# Patient Record
Sex: FEMALE | Race: BLACK OR AFRICAN AMERICAN | NOT HISPANIC OR LATINO | Employment: OTHER | URBAN - METROPOLITAN AREA
[De-identification: names, ages, dates, MRNs, and addresses within clinical notes are randomized per-mention and may not be internally consistent; named-entity substitution may affect disease eponyms.]

---

## 2017-06-26 ENCOUNTER — PROCEDURE VISIT (OUTPATIENT)
Dept: PULMONOLOGY | Facility: CLINIC | Age: 67
End: 2017-06-26
Payer: MEDICARE

## 2017-06-26 ENCOUNTER — LAB VISIT (OUTPATIENT)
Dept: LAB | Facility: HOSPITAL | Age: 67
End: 2017-06-26
Attending: INTERNAL MEDICINE
Payer: MEDICARE

## 2017-06-26 ENCOUNTER — OFFICE VISIT (OUTPATIENT)
Dept: PULMONOLOGY | Facility: CLINIC | Age: 67
End: 2017-06-26
Payer: MEDICARE

## 2017-06-26 VITALS
HEART RATE: 67 BPM | DIASTOLIC BLOOD PRESSURE: 62 MMHG | BODY MASS INDEX: 30.73 KG/M2 | SYSTOLIC BLOOD PRESSURE: 140 MMHG | BODY MASS INDEX: 30.63 KG/M2 | OXYGEN SATURATION: 99 % | WEIGHT: 179.44 LBS | WEIGHT: 180 LBS | RESPIRATION RATE: 18 BRPM | HEIGHT: 64 IN | HEIGHT: 64 IN

## 2017-06-26 DIAGNOSIS — D86.9 SARCOIDOSIS: Chronic | ICD-10-CM

## 2017-06-26 DIAGNOSIS — J98.4 MIXED RESTRICTIVE AND OBSTRUCTIVE LUNG DISEASE: Chronic | ICD-10-CM

## 2017-06-26 DIAGNOSIS — J43.9 MIXED RESTRICTIVE AND OBSTRUCTIVE LUNG DISEASE: Chronic | ICD-10-CM

## 2017-06-26 DIAGNOSIS — J96.11 CHRONIC RESPIRATORY FAILURE WITH HYPOXIA: Chronic | ICD-10-CM

## 2017-06-26 DIAGNOSIS — D86.9 SARCOIDOSIS: Primary | Chronic | ICD-10-CM

## 2017-06-26 LAB
ALBUMIN SERPL BCP-MCNC: 3.6 G/DL
ALP SERPL-CCNC: 210 U/L
ALT SERPL W/O P-5'-P-CCNC: 26 U/L
ANION GAP SERPL CALC-SCNC: 8 MMOL/L
AST SERPL-CCNC: 29 U/L
BILIRUB SERPL-MCNC: 0.3 MG/DL
BUN SERPL-MCNC: 14 MG/DL
CALCIUM SERPL-MCNC: 9 MG/DL
CHLORIDE SERPL-SCNC: 105 MMOL/L
CO2 SERPL-SCNC: 27 MMOL/L
CREAT SERPL-MCNC: 0.9 MG/DL
EST. GFR  (AFRICAN AMERICAN): >60 ML/MIN/1.73 M^2
EST. GFR  (NON AFRICAN AMERICAN): >60 ML/MIN/1.73 M^2
GLUCOSE SERPL-MCNC: 85 MG/DL
POTASSIUM SERPL-SCNC: 4 MMOL/L
PROT SERPL-MCNC: 8.3 G/DL
SODIUM SERPL-SCNC: 140 MMOL/L

## 2017-06-26 PROCEDURE — 80053 COMPREHEN METABOLIC PANEL: CPT

## 2017-06-26 PROCEDURE — 99214 OFFICE O/P EST MOD 30 MIN: CPT | Mod: 25,S$PBB,, | Performed by: INTERNAL MEDICINE

## 2017-06-26 PROCEDURE — 36415 COLL VENOUS BLD VENIPUNCTURE: CPT

## 2017-06-26 PROCEDURE — 99999 PR PBB SHADOW E&M-EST. PATIENT-LVL III: CPT | Mod: PBBFAC,,, | Performed by: INTERNAL MEDICINE

## 2017-06-26 PROCEDURE — 94620 PR PULMONARY STRESS TESTING,SIMPLE: CPT | Mod: 26,S$PBB,, | Performed by: INTERNAL MEDICINE

## 2017-06-26 PROCEDURE — 1159F MED LIST DOCD IN RCRD: CPT | Mod: ,,, | Performed by: INTERNAL MEDICINE

## 2017-06-26 RX ORDER — AZATHIOPRINE 50 MG/1
50 TABLET ORAL DAILY
Qty: 30 TABLET | Refills: 6 | Status: SHIPPED | OUTPATIENT
Start: 2017-06-26 | End: 2019-05-14 | Stop reason: SDUPTHER

## 2017-06-26 RX ORDER — HYDROCODONE BITARTRATE AND ACETAMINOPHEN 5; 325 MG/1; MG/1
TABLET ORAL
COMMUNITY
Start: 2016-08-15 | End: 2018-05-01 | Stop reason: DRUGHIGH

## 2017-06-26 RX ORDER — AMLODIPINE AND OLMESARTAN MEDOXOMIL 10; 40 MG/1; MG/1
1 TABLET ORAL DAILY
COMMUNITY
Start: 2016-12-20

## 2017-06-26 RX ORDER — IPRATROPIUM BROMIDE AND ALBUTEROL SULFATE 2.5; .5 MG/3ML; MG/3ML
3 SOLUTION RESPIRATORY (INHALATION) EVERY 6 HOURS PRN
Qty: 120 VIAL | Refills: 11 | Status: SHIPPED | OUTPATIENT
Start: 2017-06-26 | End: 2018-05-08 | Stop reason: SDUPTHER

## 2017-06-26 NOTE — PROCEDURES
"[facility] - [facility]  Six Minute Walk     SUMMARY     Ordering Provider: Dr. Goodrich   Interpreting Provider: Dr. Goodrich  Performing nurse/tech/RT: Shannan RRT  Diagnosis:  (Chronic Respiratory Failure with Hypoxia)  Height: 5' 4" (162.6 cm)  Weight: 81.4 kg (179 lb 7.3 oz)  BMI (Calculated): 30.9   Patient Race:             Phase Oxygen Assessment Supplemental O2 Heart   Rate Blood Pressure Saad Dyspnea Scale Rating   Resting 98 % Room Air 62 bpm 145/73 3   Exercise        Minute        1 97 % Room Air 76 bpm     2 94 % Room Air 73 bpm     3 94 % Room Air 79 bpm     4 95 % Room Air 69 bpm     5 94 % Room Air 70 bpm     6  94 % Room Air 80 bpm 138/69 4   Recovery        Minute        1 98 % Room Air 73 bpm     2 99 % Room Air 67 bpm     3 99 % Room Air 65 bpm     4 99 % Room Air 67 bpm 154/80 3     Six Minute Walk Summary  6MWT Status: completed without stopping  Patient Reported: Dyspnea     Interpretation:  Did the patient stop or pause?: No                     Total Time Walked (Calculated): 360 seconds  Final Partial Lap Distance (feet): 100 feet  Total Distance Meters (Calculated): 274.32 meters  Predicted Distance Meters (Calculated): 442.2 meters  Percentage of Predicted (Calculated): 62.04  Peak VO2 (Calculated): 12.21  Mets: 3.49  Has The Patient Had a Previous Six Minute Walk Test?: Yes       Previous 6MWT Results  Has The Patient Had a Previous Six Minute Walk Test?: Yes  Date of Previous Test: 11/14/16  Total Time Walked: 360 seconds  Total Distance (meters): 205.74  Predicted Distance (meters): 451.87 meters  Percentage of Predicted: 45.53  Percent Change (Calculated): -0.33      PHYSICIAN INTERPRETATION:  Six minute walk distance is 274.32 / 442.2 meters (62.04 % predicted) with moderate dyspnea. Peak VO2 during walking was 12.21  Ml/min/kg [ 3.49 METS]. During exercise, there was no significant desaturation while breathing room air. Blood pressure remained stable and Heart rate " increased significantly with walking. Hypertension was present prior to exercise. The patient did not report non-pulmonary symptoms during exercise. The patient did complete the study, walking 360 seconds of the 360 second test. Since the previous study in 11/14/16, exercise capacity is significantly improved. Based upon age and body mass index, exercise capacity is less than predicted.

## 2017-06-26 NOTE — ASSESSMENT & PLAN NOTE
Stable and controlled. START IMURAN. Orders in EMR. Monthly CMP. Continue all previously prescribed medications.

## 2017-06-26 NOTE — ASSESSMENT & PLAN NOTE
MIXED COPD ROS: taking medications as instructed, no medication side effects noted, no significant ongoing wheezing or shortness of breath, using bronchodilator MDI less than twice a week.   New concerns: None.   Exam: appears well, vitals normal, no respiratory distress, acyanotic, normal RR, chest clear to auscultation, no wheezes, rales or rhonchi, symmetric air entry.   Assessment:  MIXED COPD stable.   Plan: BREO,VENTOLIN, DUONEB. Current treatment plan is effective, no change in therapy. DUONEB refilled.  DISCONTINUE oxygen. CXR in 6 months.

## 2017-06-26 NOTE — PATIENT INSTRUCTIONS
Anatomía de los pulmones  Los pulmones se llenan de aire para suministrar al cuerpo el oxígeno que necesita para funcionar. Al igual que los demás tejidos del cuerpo, los pulmones contienen millones de diminutas células especializadas. Las células pulmonares [place] mueren y son reemplazadas por idénticas células nuevas. Onelia proceso natural ayuda a mantener la hugh de los pulmones.    Date Last Reviewed: 8/4/2014  © 1728-8313 [employer]. [address] [place] [address], [place] [address]. Todos los derechos reservados. Esta información no pretende sustituir la atención médica profesional. Sólo oliver médico puede diagnosticar y tratar un problema de hugh.

## 2017-06-26 NOTE — PROGRESS NOTES
Subjective:      Patient ID: Coral Gamboa is a 67 y.o. female.  Patient Active Problem List   Diagnosis    Mixed restrictive and obstructive lung disease    Sarcoidosis     Problem list has been reviewed.    Chief Complaint: Sarcoidosis and Mixed restrictive and obsructive lung disease    HPI  She is here for routine follow up. 6 Minute walk test reviewed with pateint.  She denies cough sputum, hemoptysis, pain with breathing, wheezing, asthma.  She never filled her imuran as prescribed.  Her current respiratory therapy regimen is albuterol inhaler, BREO ELLIPTA and DUONEB which provides relief. She is adherent with her regimen.  A full  review of systems, past , family  and social histories was performed except as mentioned in the note above, these are non contributory to the main issues discussed today.      Previous Report Reviewed: office notes     The following portions of the patient's history were reviewed and updated as appropriate: She  has a past medical history of GERD (gastroesophageal reflux disease); Hypertension; Sarcoidosis; and Sarcoidosis.  She  has a past surgical history that includes Tubal ligation; Tubal ligation; and Eye surgery.  Her family history is not on file.  She  reports that she has quit smoking. She has never used smokeless tobacco. She reports that she does not drink alcohol or use drugs.  She has a current medication list which includes the following prescription(s): albuterol, albuterol-ipratropium 2.5mg-0.5mg/3ml, amitriptyline, amlodipine-olmesartan, aspirin, dexlansoprazole, fluticasone-vilanterol, gabapentin, hydrocodone-acetaminophen 5-325mg, and azathioprine.  She is allergic to penicillins and sulfa (sulfonamide antibiotics)..    Review of Systems   Constitutional: Negative for fever, chills, activity change and fatigue.   HENT: Positive for trouble swallowing and congestion. Negative for postnasal drip, sinus pressure and hearing loss.    Respiratory: Positive for  "cough and use of rescue inhaler. Negative for hemoptysis, sputum production and wheezing.    Cardiovascular: Negative for chest pain, palpitations and leg swelling.   Genitourinary: Negative for difficulty urinating and hematuria.   Endocrine: Negative for cold intolerance and heat intolerance.    Musculoskeletal: Negative for back pain and joint swelling.   Skin: Negative for rash.   Gastrointestinal: Negative for nausea, vomiting, abdominal pain and abdominal distention.   Neurological: Negative for dizziness and light-headedness.   Hematological: Negative for adenopathy.   Psychiatric/Behavioral: Negative for confusion. The patient is not nervous/anxious.       Objective:     BP (!) 140/62   Pulse 67   Resp 18   Ht 5' 4" (1.626 m)   Wt 81.6 kg (180 lb)   SpO2 99%   BMI 30.90 kg/m²   Body mass index is 30.9 kg/m².    Physical Exam   Constitutional: She is oriented to person, place, and time. She appears well-developed and well-nourished.   HENT:   Head: Normocephalic and atraumatic.   Eyes: EOM are normal. Pupils are equal, round, and reactive to light.   Neck: Normal range of motion. Neck supple.   Cardiovascular: Normal rate and regular rhythm.    Pulmonary/Chest: Effort normal and breath sounds normal.   Abdominal: Soft. Bowel sounds are normal.   Neurological: She is alert and oriented to person, place, and time.   Skin: Skin is warm and dry.   Psychiatric: She has a normal mood and affect. Her behavior is normal.   Nursing note and vitals reviewed.      Personal Diagnostic Review  Six minute walk distance is 274.32 / 442.2 meters (62.04 % predicted) with moderate dyspnea. Peak VO2 during walking was 12.21  Ml/min/kg [ 3.49 METS]. During exercise, there was no significant desaturation while breathing room air. Blood pressure remained stable and Heart rate increased significantly with walking. Hypertension was present prior to exercise. The patient did not report non-pulmonary symptoms during " exercise.  The patient did complete the study, walking 360 seconds of the 360 second test. Since the previous study in 11/14/16, exercise capacity is significantly improved. Based upon age and body mass index, exercise capacity is less than predicted.      Lab Review   Lab Results   Component Value Date     (L) 06/29/2016    K 5.3 (H) 06/29/2016    CL 97 06/29/2016    CO2 29 06/29/2016    BUN 24 (H) 06/29/2016    CREATININE 1.3 06/29/2016    CALCIUM 9.3 06/29/2016        Assessment:     1. Sarcoidosis Stable   2. Mixed restrictive and obstructive lung disease Stable     Outpatient Encounter Prescriptions as of 6/26/2017   Medication Sig Dispense Refill    albuterol (PROVENTIL HFA) 90 mcg/actuation inhaler Inhale 2 puffs into the lungs every 6 (six) hours as needed for Wheezing. 18 g 11    albuterol-ipratropium 2.5mg-0.5mg/3mL (DUO-NEB) 0.5 mg-3 mg(2.5 mg base)/3 mL nebulizer solution Take 3 mLs by nebulization every 6 (six) hours as needed for Wheezing. 120 vial 11    amitriptyline (ELAVIL) 50 MG tablet Take 50 mg by mouth every evening.      amlodipine-olmesartan (JULIO) 10-40 mg per tablet Take 1 tablet by mouth.      aspirin (ECOTRIN) 81 MG EC tablet Take 81 mg by mouth once daily.      dexlansoprazole (DEXILANT) 60 mg capsule Take 60 mg by mouth once daily.      fluticasone-vilanterol (BREO ELLIPTA) 100-25 mcg/dose diskus inhaler Inhale 1 puff into the lungs once daily. 1 each 11    gabapentin (NEURONTIN) 300 MG capsule Take 300 mg by mouth 2 (two) times daily.      hydrocodone-acetaminophen 5-325mg (NORCO) 5-325 mg per tablet TK 1 T PO TID PRN      [DISCONTINUED] albuterol-ipratropium 2.5mg-0.5mg/3mL (DUO-NEB) 0.5 mg-3 mg(2.5 mg base)/3 mL nebulizer solution Take 3 mLs by nebulization every 6 (six) hours as needed for Wheezing. 120 vial 11    azathioprine (IMURAN) 50 mg Tab Take 1 tablet (50 mg total) by mouth once daily. 30 tablet 6    [DISCONTINUED] azathioprine (IMURAN) 50 mg Tab Take 1  "tablet (50 mg total) by mouth once daily. 30 tablet 11    [DISCONTINUED] JULIO 10-40 mg per tablet       [DISCONTINUED] cyclobenzaprine (FLEXERIL) 5 MG tablet Take 5 mg by mouth 3 (three) times daily as needed for Muscle spasms.      [DISCONTINUED] diclofenac (VOLTAREN) 75 MG EC tablet Take 1 tablet (75 mg total) by mouth 2 (two) times daily. 20 tablet 0    [DISCONTINUED] hydrocodone-acetaminophen  (LORTAB)  mg per tablet Take 1 tablet by mouth.      [DISCONTINUED] insulin glargine, TOUJEO, (TOUJEO SOLOSTAR) 300 unit/mL (1.5 mL) InPn pen Inject 10 Units into the skin once daily.       No facility-administered encounter medications on file as of 6/26/2017.      Orders Placed This Encounter   Procedures    HME - OTHER     Order Specific Question:   Type of Equipment:     Answer:   DISCONTINUE OXYGEN     Order Specific Question:   Height:     Answer:   5' 4" (1.626 m)     Order Specific Question:   Weight:     Answer:   81.6 kg (180 lb)    X-Ray Chest PA And Lateral     Standing Status:   Future     Standing Expiration Date:   8/6/2018    Comprehensive metabolic panel     Standing Status:   Standing     Number of Occurrences:   6     Standing Expiration Date:   12/26/2017     Plan:     Discussed diagnosis, its evaluation, treatment and usual course. All questions answered.    Sarcoidosis  Stable and controlled. START IMURAN. Orders in EMR. Monthly CMP. Continue all previously prescribed medications.       Mixed restrictive and obstructive lung disease  MIXED COPD ROS: taking medications as instructed, no medication side effects noted, no significant ongoing wheezing or shortness of breath, using bronchodilator MDI less than twice a week.   New concerns: None.   Exam: appears well, vitals normal, no respiratory distress, acyanotic, normal RR, chest clear to auscultation, no wheezes, rales or rhonchi, symmetric air entry.   Assessment:  MIXED COPD stable.   Plan: BREO,VENTOLIN, DUONEB. Current " treatment plan is effective, no change in therapy. DUONEB refilled.  DISCONTINUE oxygen. CXR in 6 months.            TIME SPENT WITH PATIENT: Time spent: 40 minutes in face to face  discussion concerning diagnosis, prognosis, review of lab and test results, benefits of treatment as well as management of disease, counseling of patient and coordination of care between various health  care providers . Greater than half the time spent was used for coordination of care and counseling of patient.     Return in about 6 months (around 12/26/2017) for Sarcoidosis, Mixed obstructive and restrictive lung disease.

## 2017-07-06 ENCOUNTER — TELEPHONE (OUTPATIENT)
Dept: PULMONOLOGY | Facility: CLINIC | Age: 67
End: 2017-07-06

## 2017-07-06 NOTE — TELEPHONE ENCOUNTER
----- Message from Candi Cabrera sent at 7/6/2017  2:53 PM CDT -----  Contact: [phone]  Would like to consult with nurse regarding notes received to discontinue services.if patient will no longer need oxygen inogen will need a DC order on a RX form. Please call back at [phone].      Thanks,  Candi Cabrera

## 2017-07-06 NOTE — TELEPHONE ENCOUNTER
Spoke with Jannie at [facility], she need d/c order for pt's oxygen faxed to [phone]. Faxed over as requested.

## 2017-10-13 ENCOUNTER — HOSPITAL ENCOUNTER (OUTPATIENT)
Dept: RADIOLOGY | Facility: HOSPITAL | Age: 67
Discharge: HOME | End: 2017-10-13
Attending: INTERNAL MEDICINE
Payer: MEDICARE

## 2017-10-13 ENCOUNTER — OFFICE VISIT (OUTPATIENT)
Dept: PULMONOLOGY | Facility: CLINIC | Age: 67
End: 2017-10-13
Payer: MEDICARE

## 2017-10-13 ENCOUNTER — PROCEDURE VISIT (OUTPATIENT)
Dept: PULMONOLOGY | Facility: CLINIC | Age: 67
End: 2017-10-13
Payer: MEDICARE

## 2017-10-13 VITALS
WEIGHT: 174 LBS | OXYGEN SATURATION: 97 % | SYSTOLIC BLOOD PRESSURE: 140 MMHG | DIASTOLIC BLOOD PRESSURE: 74 MMHG | BODY MASS INDEX: 29.71 KG/M2 | HEART RATE: 74 BPM | HEIGHT: 64 IN | RESPIRATION RATE: 18 BRPM

## 2017-10-13 VITALS — HEIGHT: 64 IN | WEIGHT: 174 LBS | BODY MASS INDEX: 29.71 KG/M2

## 2017-10-13 DIAGNOSIS — J43.9 MIXED RESTRICTIVE AND OBSTRUCTIVE LUNG DISEASE: Chronic | ICD-10-CM

## 2017-10-13 DIAGNOSIS — J98.4 MIXED RESTRICTIVE AND OBSTRUCTIVE LUNG DISEASE: Chronic | ICD-10-CM

## 2017-10-13 DIAGNOSIS — J43.9 MIXED RESTRICTIVE AND OBSTRUCTIVE LUNG DISEASE: Primary | Chronic | ICD-10-CM

## 2017-10-13 DIAGNOSIS — J98.4 MIXED RESTRICTIVE AND OBSTRUCTIVE LUNG DISEASE: Primary | Chronic | ICD-10-CM

## 2017-10-13 DIAGNOSIS — D86.9 SARCOIDOSIS: Chronic | ICD-10-CM

## 2017-10-13 PROCEDURE — 71020 XR CHEST PA AND LATERAL: CPT | Mod: 26,,, | Performed by: RADIOLOGY

## 2017-10-13 PROCEDURE — 99213 OFFICE O/P EST LOW 20 MIN: CPT | Mod: PBBFAC,25 | Performed by: INTERNAL MEDICINE

## 2017-10-13 PROCEDURE — 94620 PR PULMONARY STRESS TESTING,SIMPLE: CPT | Mod: 26,S$PBB,, | Performed by: INTERNAL MEDICINE

## 2017-10-13 PROCEDURE — 99215 OFFICE O/P EST HI 40 MIN: CPT | Mod: 25,S$PBB,, | Performed by: INTERNAL MEDICINE

## 2017-10-13 PROCEDURE — 71020 XR CHEST PA AND LATERAL: CPT | Mod: TC

## 2017-10-13 PROCEDURE — 99999 PR PBB SHADOW E&M-EST. PATIENT-LVL III: CPT | Mod: PBBFAC,,, | Performed by: INTERNAL MEDICINE

## 2017-10-13 PROCEDURE — 94620 PR PULMONARY STRESS TESTING,SIMPLE: CPT | Mod: PBBFAC

## 2017-10-13 RX ORDER — OMEPRAZOLE 20 MG/1
CAPSULE, DELAYED RELEASE ORAL
COMMUNITY
Start: 2017-08-30

## 2017-10-13 RX ORDER — ALBUTEROL SULFATE 90 UG/1
2 AEROSOL, METERED RESPIRATORY (INHALATION) EVERY 6 HOURS PRN
Qty: 18 G | Refills: 11 | Status: SHIPPED | OUTPATIENT
Start: 2017-10-13 | End: 2019-05-14 | Stop reason: SDUPTHER

## 2017-10-13 RX ORDER — FLUTICASONE FUROATE AND VILANTEROL 100; 25 UG/1; UG/1
1 POWDER RESPIRATORY (INHALATION) DAILY
Qty: 1 EACH | Refills: 11 | Status: SHIPPED | OUTPATIENT
Start: 2017-10-13 | End: 2019-05-14 | Stop reason: SDUPTHER

## 2017-10-13 NOTE — ASSESSMENT & PLAN NOTE
MIXED COPD ROS: taking medications as instructed, no medication side effects noted, no significant ongoing wheezing or shortness of breath, using bronchodilator MDI less than twice a week.   New concerns: None.   Exam: appears well, vitals normal, no respiratory distress, acyanotic, normal RR, chest clear to auscultation, no wheezes, rales or rhonchi, symmetric air entry.   Assessment:  MIXED COPD stable.   Plan: BREO,PROVENTIL, DUONEB. Current treatment plan is effective, no change in therapy. 6MWT TODAY.  BREO, PROVENTIL REFILLED TODAY. PFT in 6 months.

## 2017-10-13 NOTE — PATIENT INSTRUCTIONS
Lung Anatomy  Your lungs take air in to give your body oxygen, which the body needs to work. Your lungs, like all the tissues in your body, are made up of billions of tiny specialized cells. Old lung cells die and are replaced by new, identical lung cells. This natural process helps ensure healthy lungs.    Date Last Reviewed: 11/1/2016  © 3357-1522 [employer]. [address] [place] [address], [place] [address]. All rights reserved. This information is not intended as a substitute for professional medical care. Always follow your healthcare professional's instructions.

## 2017-10-13 NOTE — PROGRESS NOTES
Subjective:      Patient ID: Coral Gamboa is a 67 y.o. female.  Patient Active Problem List   Diagnosis    Mixed restrictive and obstructive lung disease    Sarcoidosis     Problem list has been reviewed.    Chief Complaint: Sarcoidosis and chronic respiratory failure with hypoxia    HPI  She has been feeling hot and cold for 2 weeks. She reports associated dyspnea and fatigue. She denies cough sputum, hemoptysis, pain with breathing, wheezing, asthma.  She Imuran 50 mg PO daily. She was seen and evaluated by her PCP this AM.     Her current respiratory therapy regimen is albuterol inhaler.     BREO ELLIPTA and DUONEB which provides relief. She is adherent with her regimen.      A full  review of systems, past , family  and social histories was performed except as mentioned in the note above, these are non contributory to the main issues discussed today.      Previous Report Reviewed: office notes     The following portions of the patient's history were reviewed and updated as appropriate: She  has a past medical history of GERD (gastroesophageal reflux disease); Hypertension; Sarcoidosis; and Sarcoidosis.  She  has a past surgical history that includes Tubal ligation; Tubal ligation; and Eye surgery.  Her family history is not on file.  She  reports that she has quit smoking. She has never used smokeless tobacco. She reports that she does not drink alcohol or use drugs.  She has a current medication list which includes the following prescription(s): albuterol, albuterol-ipratropium 2.5mg-0.5mg/3ml, amitriptyline, amlodipine-olmesartan, aspirin, azathioprine, fluticasone-vilanterol, hydrocodone-acetaminophen 5-325mg, and omeprazole.  She is allergic to penicillins and sulfa (sulfonamide antibiotics)..    Review of Systems   Constitutional: Negative for fever, chills, activity change and fatigue.   HENT: Positive for trouble swallowing and congestion. Negative for postnasal drip, sinus pressure and hearing loss.  "   Respiratory: Positive for cough and use of rescue inhaler. Negative for hemoptysis, sputum production and wheezing.    Cardiovascular: Negative for chest pain, palpitations and leg swelling.   Genitourinary: Negative for difficulty urinating and hematuria.   Endocrine: Negative for cold intolerance and heat intolerance.    Musculoskeletal: Negative for back pain and joint swelling.   Skin: Negative for rash.   Gastrointestinal: Negative for nausea, vomiting, abdominal pain and abdominal distention.   Neurological: Negative for dizziness and light-headedness.   Hematological: Negative for adenopathy.   Psychiatric/Behavioral: Negative for confusion. The patient is not nervous/anxious.       Objective:     BP (!) 140/74   Pulse 74   Resp 18   Ht 5' 4" (1.626 m)   Wt 78.9 kg (174 lb)   SpO2 97%   BMI 29.87 kg/m²   Body mass index is 29.87 kg/m².    Physical Exam   Constitutional: She is oriented to person, place, and time. She appears well-developed and well-nourished.   HENT:   Head: Normocephalic and atraumatic.   Eyes: EOM are normal. Pupils are equal, round, and reactive to light.   Neck: Normal range of motion. Neck supple.   Cardiovascular: Normal rate and regular rhythm.    Pulmonary/Chest: Effort normal and breath sounds normal.   Abdominal: Soft. Bowel sounds are normal.   Neurological: She is alert and oriented to person, place, and time.   Skin: Skin is warm and dry.   Psychiatric: She has a normal mood and affect. Her behavior is normal.   Nursing note and vitals reviewed.      Personal Diagnostic Review  Six minute walk distance is 312.42 / 447.87  meters (69.76 % predicted) with moderate dyspnea. Peak VO2 during walking was 13.35  Ml/min/kg [ 3.81 METS]. During exercise, there was no significant desaturation while breathing room air. Blood pressure remained stable and Heart rate increased significantly with walking. The patient reported non-pulmonary symptoms during exercise Headache. The patient " did complete the study, walking 360 seconds of the 360 second test. Since the previous study in 06/26/17, exercise capacity is significantly improved. Based upon age and body mass index, exercise capacity is less than predicted.    CXR: 10/13/17: The lungs are clear and free of infiltrate.  No pleural effusion or pneumothorax is identified.  The heart is not enlarged.      Lab Review   Lab Results   Component Value Date     06/26/2017    K 4.0 06/26/2017     06/26/2017    CO2 27 06/26/2017    BUN 14 06/26/2017    CREATININE 0.9 06/26/2017    CALCIUM 9.0 06/26/2017     Lab Results   Component Value Date    CKMB 0.5 06/20/2011    TROPONINI 0.007 04/17/2016    TROPONINI <0.04 06/20/2011     Lab Results   Component Value Date    WBC 10.83 06/29/2016    HGB 12.7 06/29/2016    HCT 39.4 06/29/2016    MCV 81 (L) 06/29/2016     06/29/2016             Assessment:     1. Mixed restrictive and obstructive lung disease Chronic   2. Sarcoidosis Stable     Outpatient Encounter Prescriptions as of 10/13/2017   Medication Sig Dispense Refill    albuterol (PROVENTIL HFA) 90 mcg/actuation inhaler Inhale 2 puffs into the lungs every 6 (six) hours as needed for Wheezing. 18 g 11    albuterol-ipratropium 2.5mg-0.5mg/3mL (DUO-NEB) 0.5 mg-3 mg(2.5 mg base)/3 mL nebulizer solution Take 3 mLs by nebulization every 6 (six) hours as needed for Wheezing. 120 vial 11    amitriptyline (ELAVIL) 50 MG tablet Take 50 mg by mouth every evening.      amlodipine-olmesartan (JULIO) 10-40 mg per tablet Take 1 tablet by mouth.      aspirin (ECOTRIN) 81 MG EC tablet Take 81 mg by mouth once daily.      azathioprine (IMURAN) 50 mg Tab Take 1 tablet (50 mg total) by mouth once daily. 30 tablet 6    fluticasone-vilanterol (BREO ELLIPTA) 100-25 mcg/dose diskus inhaler Inhale 1 puff into the lungs once daily. 1 each 11    hydrocodone-acetaminophen 5-325mg (NORCO) 5-325 mg per tablet TK 1 T PO TID PRN      omeprazole (PRILOSEC) 20 MG  capsule TAKE ONE CAPSULE BY MOUTH DAILY      [DISCONTINUED] albuterol (PROVENTIL HFA) 90 mcg/actuation inhaler Inhale 2 puffs into the lungs every 6 (six) hours as needed for Wheezing. 18 g 11    [DISCONTINUED] fluticasone-vilanterol (BREO ELLIPTA) 100-25 mcg/dose diskus inhaler Inhale 1 puff into the lungs once daily. 1 each 11    [DISCONTINUED] dexlansoprazole (DEXILANT) 60 mg capsule Take 60 mg by mouth once daily.      [DISCONTINUED] gabapentin (NEURONTIN) 300 MG capsule Take 300 mg by mouth 2 (two) times daily.       No facility-administered encounter medications on file as of 10/13/2017.      Orders Placed This Encounter   Procedures    Comprehensive metabolic panel     Standing Status:   Future     Number of Occurrences:   1     Standing Expiration Date:   12/12/2018    CBC auto differential     Standing Status:   Future     Number of Occurrences:   1     Standing Expiration Date:   12/12/2018    Stress test, pulmonary     Standing Status:   Future     Number of Occurrences:   1     Standing Expiration Date:   11/23/2018     Plan:     Discussed diagnosis, its evaluation, treatment and usual course. All questions answered.    Sarcoidosis  Stable and controlled. CONTINUE  IMURAN. CMP, CBC. ORDERS IN EMR.  Continue all previously prescribed medications.       Mixed restrictive and obstructive lung disease  MIXED COPD ROS: taking medications as instructed, no medication side effects noted, no significant ongoing wheezing or shortness of breath, using bronchodilator MDI less than twice a week.   New concerns: None.   Exam: appears well, vitals normal, no respiratory distress, acyanotic, normal RR, chest clear to auscultation, no wheezes, rales or rhonchi, symmetric air entry.   Assessment:  MIXED COPD stable.   Plan: BREO,PROVENTIL, DUONEB. Current treatment plan is effective, no change in therapy. 6MWT TODAY.  BREO, PROVENTIL REFILLED TODAY. PFT in 6 months.            TIME SPENT WITH PATIENT: Time spent:  40 minutes in face to face  discussion concerning diagnosis, prognosis, review of lab and test results, benefits of treatment as well as management of disease, counseling of patient and coordination of care between various health  care providers . Greater than half the time spent was used for coordination of care and counseling of patient.     Return in about 6 months (around 4/13/2018) for Cough, Mixed obstructive and restrictive lung disease.

## 2017-10-13 NOTE — PROCEDURES
"[facility] - Pulm Function [facility]  Six Minute Walk     SUMMARY     Ordering Provider: Dr. Goodrich   Interpreting Provider: Dr. Goodrich  Performing nurse/tech/RT: Shannan RRT  Diagnosis:  (Mixed Restrictive and Obstructive Lung Disease)  Height: 5' 4" (162.6 cm)  Weight: 78.9 kg (174 lb)  BMI (Calculated): 29.9   Patient Race:             Phase Oxygen Assessment Supplemental O2 Heart   Rate Blood Pressure Saad Dyspnea Scale Rating   Resting 100 % Room Air 62 bpm 137/70 3   Exercise        Minute        1 97 % Room Air 85 bpm     2 96 % Room Air 87 bpm     3 94 % Room Air 86 bpm     4 94 % Room Air 86 bpm     5 98 % Room Air 90 bpm     6  99 % Room Air 87 bpm 135/69 5-6   Recovery        Minute        1 100 % Room Air 77 bpm     2 100 % Room Air 67 bpm     3 100 % Room Air 64 bpm     4 100 % Room Air 67 bpm 126/58 4     Six Minute Walk Summary  6MWT Status: completed without stopping  Patient Reported: Dyspnea (Headache)     Interpretation:  Did the patient stop or pause?: No                 Total Time Walked (Calculated): 360 seconds  Final Partial Lap Distance (feet): 25 feet  Total Distance Meters (Calculated): 312.42 meters  Predicted Distance Meters (Calculated): 447.87 meters  Percentage of Predicted (Calculated): 69.76  Peak VO2 (Calculated): 13.35  Mets: 3.81  Has The Patient Had a Previous Six Minute Walk Test?: Yes       Previous 6MWT Results  Has The Patient Had a Previous Six Minute Walk Test?: Yes  Date of Previous Test: 06/26/17  Total Time Walked: 360 seconds  Total Distance (meters): 274.3  Predicted Distance (meters): 442.2 meters  Percentage of Predicted: 62.04  Percent Change (Calculated): -0.14        PHYSICIAN INTERPRETATION:  Six minute walk distance is 312.42 / 447.87  meters (69.76 % predicted) with moderate dyspnea. Peak VO2 during walking was 13.35  Ml/min/kg [ 3.81 METS]. During exercise, there was no significant desaturation while breathing room air. Blood pressure remained stable and " Heart rate increased significantly with walking. The patient reported non-pulmonary symptoms during exercise Headache. The patient did complete the study, walking 360 seconds of the 360 second test. Since the previous study in 06/26/17, exercise capacity is significantly improved. Based upon age and body mass index, exercise capacity is less than predicted.

## 2017-10-13 NOTE — ASSESSMENT & PLAN NOTE
Stable and controlled. CONTINUE  IMURAN. CMP, CBC. ORDERS IN EMR.  Continue all previously prescribed medications.

## 2018-03-21 DIAGNOSIS — R06.2 WHEEZING: Primary | ICD-10-CM

## 2018-03-22 ENCOUNTER — HOSPITAL ENCOUNTER (OUTPATIENT)
Dept: RADIOLOGY | Facility: HOSPITAL | Age: 68
Discharge: HOME | End: 2018-03-22
Attending: INTERNAL MEDICINE
Payer: MEDICARE

## 2018-03-22 ENCOUNTER — OFFICE VISIT (OUTPATIENT)
Dept: PULMONOLOGY | Facility: CLINIC | Age: 68
End: 2018-03-22
Payer: MEDICARE

## 2018-03-22 VITALS
WEIGHT: 173.19 LBS | BODY MASS INDEX: 29.57 KG/M2 | SYSTOLIC BLOOD PRESSURE: 122 MMHG | DIASTOLIC BLOOD PRESSURE: 70 MMHG | HEIGHT: 64 IN

## 2018-03-22 DIAGNOSIS — D86.9 SARCOIDOSIS: Chronic | ICD-10-CM

## 2018-03-22 DIAGNOSIS — J98.4 MIXED RESTRICTIVE AND OBSTRUCTIVE LUNG DISEASE: Primary | Chronic | ICD-10-CM

## 2018-03-22 DIAGNOSIS — J43.9 MIXED RESTRICTIVE AND OBSTRUCTIVE LUNG DISEASE: Primary | Chronic | ICD-10-CM

## 2018-03-22 DIAGNOSIS — R06.2 WHEEZING: ICD-10-CM

## 2018-03-22 PROCEDURE — 99215 OFFICE O/P EST HI 40 MIN: CPT | Mod: S$GLB,,, | Performed by: INTERNAL MEDICINE

## 2018-03-22 PROCEDURE — 71046 X-RAY EXAM CHEST 2 VIEWS: CPT | Mod: 26,,, | Performed by: RADIOLOGY

## 2018-03-22 PROCEDURE — 3074F SYST BP LT 130 MM HG: CPT | Mod: CPTII,S$GLB,, | Performed by: INTERNAL MEDICINE

## 2018-03-22 PROCEDURE — 3078F DIAST BP <80 MM HG: CPT | Mod: CPTII,S$GLB,, | Performed by: INTERNAL MEDICINE

## 2018-03-22 PROCEDURE — 71046 X-RAY EXAM CHEST 2 VIEWS: CPT | Mod: TC

## 2018-03-22 PROCEDURE — 99999 PR PBB SHADOW E&M-EST. PATIENT-LVL II: CPT | Mod: PBBFAC,,, | Performed by: INTERNAL MEDICINE

## 2018-03-22 NOTE — ASSESSMENT & PLAN NOTE
Stable and controlled. CONTINUE  IMURAN.   Complaince encouraged.   CMP, CBC. ORDERS IN EMR.    Continue all previously prescribed medications.

## 2018-03-22 NOTE — PROGRESS NOTES
Subjective:      Patient ID: Coral Gamboa is a 67 y.o. female.  Patient Active Problem List   Diagnosis    Mixed restrictive and obstructive lung disease    Sarcoidosis     Problem list has been reviewed.    Chief Complaint: Wheezing and Sarcoidosis    HPI    Group Spirometric Classification Exacerbations / year mMRC CAT   Group B: Low risk; more symptoms  GOLD 1- 2  < = 1 >= 2 >=10       FEV1 = 1.23 L ( 61% predicted)    COPD QUESTIONNAIRE  How often do you cough?: A little of the time  How often do you have phlegm (mucus) in your chest?: Almost never  How often does your chest feel tight?: Never  When you walk up a hill or one flight of stairs, how often are you breathless?: Some of the time  How often are you limited doing any activities at home?: Most of the time  How often are you confident leaving the house despite your lung condition?: Some of the time  How often do you sleep soundly?: Most of the time  How often do you have energy?: Never  Total score: 18       She reports intermittent wheezing.    She Imuran 50 mg PO daily. She is not adherent with her IMURAN  Her current respiratory therapy regimen is albuterol inhaler.     BREO ELLIPTA and DUONEB which provides relief. She is not adherent with her regimen.      A full  review of systems, past , family  and social histories was performed except as mentioned in the note above, these are non contributory to the main issues discussed today.      Previous Report Reviewed: office notes     The following portions of the patient's history were reviewed and updated as appropriate: She  has a past medical history of Diabetes mellitus; GERD (gastroesophageal reflux disease); Hypertension; Sarcoidosis; and Sarcoidosis.  She  has a past surgical history that includes Tubal ligation; Tubal ligation; and Eye surgery.  Her family history is not on file.  She  reports that she has quit smoking. She has never used smokeless tobacco. She reports that she does not drink  "alcohol or use drugs.  She has a current medication list which includes the following prescription(s): albuterol, albuterol-ipratropium 2.5mg-0.5mg/3ml, amitriptyline, amlodipine-olmesartan, aspirin, hydrocodone-acetaminophen 5-325mg, omeprazole, sitagliptin, azathioprine, and fluticasone-vilanterol.  She is allergic to penicillins and sulfa (sulfonamide antibiotics)..    Review of Systems   Constitutional: Negative for fever, chills, activity change and fatigue.   HENT: Positive for trouble swallowing and congestion. Negative for postnasal drip, sinus pressure and hearing loss.    Respiratory: Positive for cough, wheezing and use of rescue inhaler. Negative for hemoptysis and sputum production.    Cardiovascular: Negative for chest pain, palpitations and leg swelling.   Genitourinary: Negative for difficulty urinating and hematuria.   Endocrine: Negative for cold intolerance and heat intolerance.    Musculoskeletal: Negative for back pain and joint swelling.   Skin: Negative for rash.   Gastrointestinal: Negative for nausea, vomiting, abdominal pain and abdominal distention.   Neurological: Negative for dizziness and light-headedness.   Hematological: Negative for adenopathy.   Psychiatric/Behavioral: Negative for confusion. The patient is not nervous/anxious.       Objective:     /70   Ht 5' 4" (1.626 m)   Wt 78.5 kg (173 lb 2.7 oz)   BMI 29.72 kg/m²   Body mass index is 29.72 kg/m².    Physical Exam   Constitutional: She is oriented to person, place, and time. She appears well-developed and well-nourished.   HENT:   Head: Normocephalic and atraumatic.   Eyes: EOM are normal. Pupils are equal, round, and reactive to light.   Neck: Normal range of motion. Neck supple.   Cardiovascular: Normal rate and regular rhythm.    Pulmonary/Chest: Effort normal and breath sounds normal.   Abdominal: Soft. Bowel sounds are normal.   Neurological: She is alert and oriented to person, place, and time.   Skin: Skin is " warm and dry.   Psychiatric: She has a normal mood and affect. Her behavior is normal.   Nursing note and vitals reviewed.      Personal Diagnostic Review    CXR: 03/21/18: The lungs are clear, with normal appearance of pulmonary vasculature and no pleural effusion or pneumothorax.    The cardiac silhouette is normal in size. The hilar and mediastinal contours are unremarkable.    Bones are intact.              Assessment / plan :       Discussed diagnosis, its evaluation, treatment and usual course. All questions answered.    Problem List Items Addressed This Visit        Pulmonary    Mixed restrictive and obstructive lung disease - Primary    Current Assessment & Plan     MIXED COPD ROS: taking medications as instructed, no medication side effects noted, no significant ongoing wheezing or shortness of breath, using bronchodilator MDI less than twice a week.   New concerns: None.   Exam: appears well, vitals normal, no respiratory distress, acyanotic, normal RR, chest clear to auscultation, no wheezes, rales or rhonchi, symmetric air entry.   Assessment:  MIXED COPD stable.   Plan: BREO,PROVENTIL, DUONEB. Compliance encouraged. Current treatment plan is effective, no change in therapy. PFT in 1 month.          Relevant Orders    CBC auto differential    Comprehensive metabolic panel    Complete PFT with bronchodilator       Immunology/Multi System    Sarcoidosis    Current Assessment & Plan     Stable and controlled. CONTINUE  IMURAN.   Complaince encouraged.   CMP, CBC. ORDERS IN EMR.    Continue all previously prescribed medications.            Relevant Orders    Angiotensin converting enzyme               TIME SPENT WITH PATIENT: Time spent: 40 minutes in face to face  discussion concerning diagnosis, prognosis, review of lab and test results, benefits of treatment as well as management of disease, counseling of patient and coordination of care between various health  care providers . Greater than half the time  spent was used for coordination of care and counseling of patient.     Follow-up in about 1 month (around 4/22/2018) for Mixed obstructive and restrictive lung disease, Sarcoidosis.

## 2018-03-22 NOTE — ASSESSMENT & PLAN NOTE
MIXED COPD ROS: taking medications as instructed, no medication side effects noted, no significant ongoing wheezing or shortness of breath, using bronchodilator MDI less than twice a week.   New concerns: None.   Exam: appears well, vitals normal, no respiratory distress, acyanotic, normal RR, chest clear to auscultation, no wheezes, rales or rhonchi, symmetric air entry.   Assessment:  MIXED COPD stable.   Plan: BREO,PROVENTIL, DUONEB. Compliance encouraged. Current treatment plan is effective, no change in therapy. PFT in 1 month.

## 2018-05-01 ENCOUNTER — HOSPITAL ENCOUNTER (EMERGENCY)
Facility: HOSPITAL | Age: 68
Discharge: HOME | End: 2018-05-01
Attending: EMERGENCY MEDICINE
Payer: MEDICARE

## 2018-05-01 VITALS
SYSTOLIC BLOOD PRESSURE: 152 MMHG | OXYGEN SATURATION: 98 % | BODY MASS INDEX: 29.66 KG/M2 | HEIGHT: 64 IN | RESPIRATION RATE: 16 BRPM | WEIGHT: 173.75 LBS | DIASTOLIC BLOOD PRESSURE: 69 MMHG | HEART RATE: 78 BPM | TEMPERATURE: 99 F

## 2018-05-01 DIAGNOSIS — M25.552 LEFT HIP PAIN: ICD-10-CM

## 2018-05-01 DIAGNOSIS — S76.912A MUSCLE STRAIN OF LEFT THIGH, INITIAL ENCOUNTER: Primary | ICD-10-CM

## 2018-05-01 DIAGNOSIS — M25.529 ELBOW PAIN: ICD-10-CM

## 2018-05-01 DIAGNOSIS — M25.521 RIGHT ELBOW PAIN: ICD-10-CM

## 2018-05-01 LAB
BILIRUB UR QL STRIP: NEGATIVE
CLARITY UR: CLEAR
COLOR UR: YELLOW
GLUCOSE UR QL STRIP: NEGATIVE
HGB UR QL STRIP: NEGATIVE
KETONES UR QL STRIP: NEGATIVE
LEUKOCYTE ESTERASE UR QL STRIP: NEGATIVE
NITRITE UR QL STRIP: NEGATIVE
PH UR STRIP: 7 [PH] (ref 5–8)
PROT UR QL STRIP: NEGATIVE
SP GR UR STRIP: 1.01 (ref 1–1.03)
URN SPEC COLLECT METH UR: NORMAL
UROBILINOGEN UR STRIP-ACNC: NEGATIVE EU/DL

## 2018-05-01 PROCEDURE — 81003 URINALYSIS AUTO W/O SCOPE: CPT

## 2018-05-01 PROCEDURE — 99284 EMERGENCY DEPT VISIT MOD MDM: CPT | Mod: 25

## 2018-05-01 RX ORDER — HYDROCODONE BITARTRATE AND ACETAMINOPHEN 7.5; 325 MG/1; MG/1
1 TABLET ORAL EVERY 6 HOURS PRN
Qty: 15 TABLET | Refills: 0 | OUTPATIENT
Start: 2018-05-01 | End: 2024-02-21

## 2018-05-01 NOTE — ED PROVIDER NOTES
SCRIBE #1 NOTE: I, Mitchell Henderson, am scribing for, and in the presence of, Fortunato Boggs Jr., MD. I have scribed the entire note.      History      Chief Complaint   Patient presents with    Back Pain     chronic lower back pain radiating to LLE with bladder incontinence        Review of patient's allergies indicates:   Allergen Reactions    Penicillins Rash    Sulfa (sulfonamide antibiotics) Rash        HPI   HPI    5/1/2018, 3:47 PM   History obtained from the patient      History of Present Illness: Coral Gamboa is a 67 y.o. female patient who presents to the Emergency Department for an evaluation of back pain which onset suddenly x3 days ago. Pt  Reports pain and urinary incontinence after exerting herself lifting on something x3 days ago. Symptoms are constant and moderate in severity. Exacerbated by movement and relieved by nothing. Associated sxs include left leg pain and exertion. Patient denies any fall, trauma, weakness/numbness, fecal incontinence, and all other sxs at this time. No further complaints or concerns at this time.         Arrival mode: Personal vehicle    PCP: Ravindra Rincon MD       Past Medical History:  Past Medical History:   Diagnosis Date    Diabetes mellitus     GERD (gastroesophageal reflux disease)     Hypertension     Sarcoidosis     Sarcoidosis        Past Surgical History:  Past Surgical History:   Procedure Laterality Date    EYE SURGERY      TUBAL LIGATION      TUBAL LIGATION      Reversal         Family History:  Unknown    Social History:  Social History     Social History Main Topics    Smoking status: Former Smoker    Smokeless tobacco: Never Used    Alcohol use No    Drug use: No    Sexual activity: Unknown       ROS   Review of Systems   Constitutional: Negative for activity change, chills and fever.        (+) Exertion  (-) Fall  (-) Trauma   HENT: Negative for congestion, sore throat and trouble swallowing.    Respiratory: Negative for cough and  shortness of breath.    Cardiovascular: Negative for chest pain.   Gastrointestinal: Negative for abdominal pain, nausea and vomiting.   Genitourinary: Negative for dysuria and hematuria.        (+) Urinary incontinence  (-) Fecal incontinence   Musculoskeletal: Positive for back pain. Negative for gait problem, neck pain and neck stiffness.        (+) Left leg pain   Skin: Negative for rash and wound.   Neurological: Negative for dizziness, seizures, weakness, light-headedness and headaches.        (-) Saddle anesthesia   Hematological: Does not bruise/bleed easily.     Physical Exam      Initial Vitals [05/01/18 1533]   BP Pulse Resp Temp SpO2   (!) 164/63 73 17 98.9 °F (37.2 °C) 97 %      MAP       96.67          Physical Exam  Nursing Notes and Vital Signs Reviewed.  Constitutional: Patient is in no acute distress. Well-developed and well-nourished.  Head: Atraumatic. Normocephalic.  Eyes: PERRL. EOM intact. Conjunctivae are not pale. No scleral icterus.  ENT: Mucous membranes are moist. Oropharynx is clear and symmetric.    Neck: Supple. Full ROM. No lymphadenopathy.  Cardiovascular: Regular rate. Regular rhythm.  Pulmonary/Chest: No respiratory distress.   Abdominal: Soft and non-distended.  There is no tenderness.  Musculoskeletal: Moves all extremities. No obvious deformities. No edema. No calf tenderness. Right elbow tenderness with palpation. Left hip tenderness with ROM. Pt able to ambulate.   Skin: Warm and dry.  Neurological:  Alert, awake, and appropriate.  Normal speech.  No acute focal neurological deficits are appreciated.  Psychiatric: Normal affect. Good eye contact. Appropriate in content.    ED Course    Orthopedic Injury  Date/Time: 5/1/2018 4:57 PM  Performed by: DIONICIO RODRIGUEZ JR  Authorized by: DIONICIO RODRIGUEZ JR     Injury:     Injury location:  Upper leg    Location details:  Left upper leg      Pre-procedure assessment:     Neurovascular status: Neurovascularly intact      Range of motion:  "normal      Local anesthesia used?: No      Patient sedated?: No        Selections made in this section will also lock the Injury type section above.:     Immobilization:  Splint and crutches (Ace wrap)    Complications: No    Post-procedure assessment:     Neurovascular status: Neurovascularly intact      Patient tolerance:  Patient tolerated the procedure well with no immediate complications      ED Vital Signs:  Vitals:    05/01/18 1533   BP: (!) 164/63   Pulse: 73   Resp: 17   Temp: 98.9 °F (37.2 °C)   TempSrc: Oral   SpO2: 97%   Weight: 78.8 kg (173 lb 11.6 oz)   Height: 5' 4" (1.626 m)       Abnormal Lab Results:  Labs Reviewed   URINALYSIS        All Lab Results:  Results for orders placed or performed during the hospital encounter of 05/01/18   Urinalysis   Result Value Ref Range    Specimen UA Urine, Clean Catch     Color, UA Yellow Yellow, Straw, Ileana    Appearance, UA Clear Clear    pH, UA 7.0 5.0 - 8.0    Specific Gravity, UA 1.010 1.005 - 1.030    Protein, UA Negative Negative    Glucose, UA Negative Negative    Ketones, UA Negative Negative    Bilirubin (UA) Negative Negative    Occult Blood UA Negative Negative    Nitrite, UA Negative Negative    Urobilinogen, UA Negative <2.0 EU/dL    Leukocytes, UA Negative Negative         Imaging Results:  Imaging Results          X-Ray Hip 2 View Left (Final result)  Result time 05/01/18 16:27:33    Final result by YFN Carney Sr., MD (05/01/18 16:27:33)                 Impression:      1. No fracture or dislocation  2. 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      Electronically signed by: Ravindra Carney MD  Date:    05/01/2018  Time:    16:27             Narrative:    EXAMINATION:  XR HIP 2 VIEW LEFT    CLINICAL HISTORY:  left hip pain;    COMPARISON:  None    FINDINGS:  There is no fracture. There is no dislocation.  The joint space of the left hip is normal in appearance.  There are mild osteoarthritic changes in the pubic " symphysis.                               X-Ray Elbow Complete Right (Final result)  Result time 05/01/18 16:26:02    Final result by YFN Carney Sr., MD (05/01/18 16:26:02)                 Impression:      There is a 4 mm oval shaped calcific density projected lateral to the distal portion of the right humerus.  This is characteristic of prior trauma to the lateral collateral ligament.  If additional imaging evaluation is clinically indicated, I recommend consideration of a nonemergent MRI examination of the right elbow.      Electronically signed by: Ravindra Carney MD  Date:    05/01/2018  Time:    16:26             Narrative:    EXAMINATION:  XR ELBOW COMPLETE 3 VIEW RIGHT    CLINICAL HISTORY:  Pain in unspecified elbow    COMPARISON:  None    FINDINGS:  There is no fracture. There is no dislocation.  There is a 4 mm oval shaped calcific density projected lateral to the distal portion of the right humerus.                                        The Emergency Provider reviewed the vital signs and test results, which are outlined above.    ED Discussion     4:48 PM: Reassessed pt at this time. Pt is awake, alert, and in NAD. Pt states her condition has improved at this time. Discussed with pt all pertinent ED information and results. Discussed pt dx and plan of tx. Gave pt all f/u and return to the ED instructions. All questions and concerns were addressed at this time. Pt expresses understanding of information and instructions, and is comfortable with plan to discharge. Pt is stable for discharge.    I discussed with patient and/or family/caretaker that evaluation in the ED does not suggest any emergent or life threatening medical conditions requiring immediate intervention beyond what was provided in the ED, and I believe patient is safe for discharge.  Regardless, an unremarkable evaluation in the ED does not preclude the development or presence of a serious of life threatening condition. As such, patient  was instructed to return immediately for any worsening or change in current symptoms.      ED Medication(s):  Medications - No data to display    New Prescriptions    HYDROCODONE-ACETAMINOPHEN 7.5-325MG (NORCO) 7.5-325 MG PER TABLET    Take 1 tablet by mouth every 6 (six) hours as needed for Pain.       Follow-up Information     Ravindra Rincon MD. Call in 1 day.    Specialty:  Family Medicine  Why:  to schedule appt for recheck  Contact information:  [address] [facility]  [place] [id number] [phone]                     Medical Decision Making    Medical Decision Making:   Clinical Tests:   Lab Tests: Ordered and Reviewed  Radiological Study: Ordered and Reviewed           Scribe Attestation:   Scribe #1: I performed the above scribed service and the documentation accurately describes the services I performed. I attest to the accuracy of the note.    Attending:   Physician Attestation Statement for Scribe #1: I, Fortunato Boggs Jr., MD, personally performed the services described in this documentation, as scribed by Mitchell Henderson, in my presence, and it is both accurate and complete.          Clinical Impression       ICD-10-CM ICD-9-CM   1. Muscle strain of left thigh, initial encounter S76.912A 843.9   2. Elbow pain M25.529 719.42   3. Left hip pain M25.552 719.45   4. Right elbow pain M25.521 719.42       Disposition:   Disposition: Discharged  Condition: Stable         Fortunato Boggs Jr., MD  05/01/18 [id number]

## 2018-05-08 ENCOUNTER — OFFICE VISIT (OUTPATIENT)
Dept: PULMONOLOGY | Facility: CLINIC | Age: 68
End: 2018-05-08
Payer: MEDICARE

## 2018-05-08 ENCOUNTER — PROCEDURE VISIT (OUTPATIENT)
Dept: PULMONOLOGY | Facility: CLINIC | Age: 68
End: 2018-05-08
Payer: MEDICARE

## 2018-05-08 VITALS
DIASTOLIC BLOOD PRESSURE: 52 MMHG | HEART RATE: 63 BPM | BODY MASS INDEX: 29.88 KG/M2 | RESPIRATION RATE: 18 BRPM | WEIGHT: 175 LBS | OXYGEN SATURATION: 97 % | SYSTOLIC BLOOD PRESSURE: 136 MMHG | HEIGHT: 64 IN

## 2018-05-08 DIAGNOSIS — J43.9 MIXED RESTRICTIVE AND OBSTRUCTIVE LUNG DISEASE: Chronic | ICD-10-CM

## 2018-05-08 DIAGNOSIS — J98.4 MIXED RESTRICTIVE AND OBSTRUCTIVE LUNG DISEASE: Chronic | ICD-10-CM

## 2018-05-08 DIAGNOSIS — D86.9 SARCOIDOSIS: Primary | Chronic | ICD-10-CM

## 2018-05-08 LAB
POST FEF 25 75: 0.55 L/S (ref 1.17–2.55)
POST FET 100: 10.61 S
POST FEV1 FVC: 64 %
POST FEV1: 1.18 L (ref 1.65–2.26)
POST FIF 50: 3.89 L/S
POST FVC: 1.83 L (ref 2.15–2.87)
POST PEF: 3.94 L/S (ref 4.19–6.21)
PRE DLCO: 12.89 ML/MMHG/MIN (ref 17.37–25.66)
PRE ERV: 0.31 L
PRE FEF 25 75: 0.52 L/S (ref 1.17–2.55)
PRE FET 100: 9 S
PRE FEV1 FVC: 64 %
PRE FEV1: 1.12 L (ref 1.65–2.26)
PRE FIF 50: 3.55 L/S
PRE FRC PL: 1.7 L (ref 2.08–3.03)
PRE FVC: 1.75 L (ref 2.15–2.87)
PRE KROGHS K: 4.34 1/MIN
PRE PEF: 4.12 L/S (ref 4.19–6.21)
PRE RV: 1.18 L (ref 1.54–2.25)
PRE SVC: 1.75 L
PRE TLC: 2.93 L (ref 4.46–5.23)
PREDICTED DLCO: 21.51 ML/MMHG/MIN (ref 17.37–25.66)
PREDICTED FEV1 FVC: 76.78 % (ref 71.89–81.68)
PREDICTED FEV1: 1.96 L (ref 1.65–2.26)
PREDICTED FRC N2: 2.55 L (ref 2.08–3.03)
PREDICTED FRC PL: 2.55 L (ref 2.08–3.03)
PREDICTED FVC: 2.51 L (ref 2.15–2.87)
PREDICTED RV: 1.9 L (ref 1.54–2.25)
PREDICTED SVC: 2.93 L
PREDICTED TLC: 4.85 L (ref 4.46–5.23)
PROVOCATION PROTOCOL: ABNORMAL

## 2018-05-08 PROCEDURE — 99999 PR PBB SHADOW E&M-EST. PATIENT-LVL III: CPT | Mod: PBBFAC,,, | Performed by: INTERNAL MEDICINE

## 2018-05-08 PROCEDURE — 94060 EVALUATION OF WHEEZING: CPT | Mod: S$GLB,,, | Performed by: INTERNAL MEDICINE

## 2018-05-08 PROCEDURE — 3075F SYST BP GE 130 - 139MM HG: CPT | Mod: CPTII,S$GLB,, | Performed by: INTERNAL MEDICINE

## 2018-05-08 PROCEDURE — 94726 PLETHYSMOGRAPHY LUNG VOLUMES: CPT | Mod: S$GLB,,, | Performed by: INTERNAL MEDICINE

## 2018-05-08 PROCEDURE — 94729 DIFFUSING CAPACITY: CPT | Mod: S$GLB,,, | Performed by: INTERNAL MEDICINE

## 2018-05-08 PROCEDURE — 3078F DIAST BP <80 MM HG: CPT | Mod: CPTII,S$GLB,, | Performed by: INTERNAL MEDICINE

## 2018-05-08 PROCEDURE — 99214 OFFICE O/P EST MOD 30 MIN: CPT | Mod: 25,S$GLB,, | Performed by: INTERNAL MEDICINE

## 2018-05-08 RX ORDER — CLOTRIMAZOLE AND BETAMETHASONE DIPROPIONATE 10; .64 MG/G; MG/G
CREAM TOPICAL 2 TIMES DAILY PRN
COMMUNITY
Start: 2018-04-18

## 2018-05-08 RX ORDER — IPRATROPIUM BROMIDE AND ALBUTEROL SULFATE 2.5; .5 MG/3ML; MG/3ML
3 SOLUTION RESPIRATORY (INHALATION) EVERY 6 HOURS PRN
Qty: 120 VIAL | Refills: 11 | Status: SHIPPED | OUTPATIENT
Start: 2018-05-08 | End: 2019-05-14 | Stop reason: SDUPTHER

## 2018-05-08 NOTE — ASSESSMENT & PLAN NOTE
MIXED COPD ROS: taking medications as instructed, no medication side effects noted, no significant ongoing wheezing or shortness of breath, using bronchodilator MDI less than twice a week.   New concerns: None.   Exam: appears well, vitals normal, no respiratory distress, acyanotic, normal RR, chest clear to auscultation, no wheezes, rales or rhonchi, symmetric air entry.   Assessment:  MIXED COPD stable.   Plan: BREO,PROVENTIL, DUONEB. PROVENTIL REFILLED. Compliance encouraged. Current treatment plan is effective, no change in therapy. CXR in 6 months.

## 2018-05-08 NOTE — PROGRESS NOTES
Subjective:      Patient ID: Coral Gamboa is a 67 y.o. female.  Patient Active Problem List   Diagnosis    Mixed restrictive and obstructive lung disease    Sarcoidosis     Problem list has been reviewed.    Chief Complaint: Sarcoidosis and obstructive lung disease    HPI    Group Spirometric Classification Exacerbations / year mMRC CAT   Group B: Low risk; more symptoms  GOLD 1- 2  < = 1 >= 2 >=10       FEV1 = 1.12 L ( 57% predicted)      PFT reviewed with pateint who voiced understanding.    She reports intermittent cough  She Imuran 50 mg PO daily. She is adherent with her IMURAN  Her current respiratory therapy regimen: BREO ELLIPTA and DUONEB which provides relief. She is adherent with her regimen.      COPD QUESTIONNAIRE  How often do you cough?: Almost never  How often do you have phlegm (mucus) in your chest?: Never  How often does your chest feel tight?: Most of the time  When you walk up a hill or one flight of stairs, how often are you breathless?: A little of the time  How often are you limited doing any activities at home?: Almost never  How often are you confident leaving the house despite your lung condition?: A little of the time  How often do you sleep soundly?: Never  How often do you have energy?: Never  Total score: 21     A full  review of systems, past , family  and social histories was performed except as mentioned in the note above, these are non contributory to the main issues discussed today.      Previous Report Reviewed: office notes     The following portions of the patient's history were reviewed and updated as appropriate: She  has a past medical history of Diabetes mellitus; GERD (gastroesophageal reflux disease); Hypertension; Sarcoidosis; and Sarcoidosis.  She  has a past surgical history that includes Tubal ligation; Tubal ligation; and Eye surgery.  Her family history is not on file.  She  reports that she has quit smoking. She has never used smokeless tobacco. She reports  "that she does not drink alcohol or use drugs.  She has a current medication list which includes the following prescription(s): albuterol, albuterol-ipratropium 2.5mg-0.5mg/3ml, amitriptyline, amlodipine-olmesartan, aspirin, azathioprine, clotrimazole-betamethasone 1-0.05%, fluticasone-vilanterol, hydrocodone-acetaminophen 7.5-325mg, omeprazole, and sitagliptin.  She is allergic to penicillins and sulfa (sulfonamide antibiotics)..    Review of Systems   Constitutional: Negative for fever, chills, activity change and fatigue.   HENT: Positive for trouble swallowing and congestion. Negative for postnasal drip, sinus pressure and hearing loss.    Respiratory: Positive for cough, wheezing and use of rescue inhaler. Negative for hemoptysis and sputum production.    Cardiovascular: Negative for chest pain, palpitations and leg swelling.   Genitourinary: Negative for difficulty urinating and hematuria.   Endocrine: Negative for cold intolerance and heat intolerance.    Musculoskeletal: Negative for back pain and joint swelling.   Skin: Negative for rash.   Gastrointestinal: Negative for nausea, vomiting, abdominal pain and abdominal distention.   Neurological: Negative for dizziness and light-headedness.   Hematological: Negative for adenopathy.   Psychiatric/Behavioral: Negative for confusion. The patient is not nervous/anxious.       Objective:     BP (!) 136/52   Pulse 63   Resp 18   Ht 5' 4" (1.626 m)   Wt 79.4 kg (175 lb)   SpO2 97%   BMI 30.04 kg/m²   Body mass index is 30.04 kg/m².    Physical Exam   Constitutional: She is oriented to person, place, and time. She appears well-developed and well-nourished.   HENT:   Head: Normocephalic and atraumatic.   Eyes: EOM are normal. Pupils are equal, round, and reactive to light.   Neck: Normal range of motion. Neck supple.   Cardiovascular: Normal rate and regular rhythm.    Pulmonary/Chest: Effort normal and breath sounds normal.   Abdominal: Soft. Bowel sounds are " normal.   Neurological: She is alert and oriented to person, place, and time.   Skin: Skin is warm and dry.   Psychiatric: She has a normal mood and affect. Her behavior is normal.   Nursing note and vitals reviewed.      Personal Diagnostic Review    Pulmonary function tests: FEV1: 1.12  (57 % predicted), FVC:  1.75 (70 % predicted), FEV1/FVC:  64, T.93 (61 % predicted), RV/TLVC: 40 (103 % predicted), DLCO: 12.9 (60 % predicted)  Moderately severe mixed obstruction with restriction. Diffusion capacity is mildly reduced.       Ref Range & Units 1mo ago 1yr ago 2yr ago    Angio Convert Enzyme 8 - 53 U/L 35  50CM  43CM        Ref Range & Units 1mo ago 6mo ago 10mo ago    Sodium 136 - 145 mmol/L 142  143  140    Potassium 3.5 - 5.1 mmol/L 4.6  4.1  4.0    Chloride 95 - 110 mmol/L 106  106  105    CO2 23 - 29 mmol/L 27  27  27    Glucose 70 - 110 mg/dL 91  125   85    BUN, Bld 8 - 23 mg/dL 16  15  14    Creatinine 0.5 - 1.4 mg/dL 0.8  0.9  0.9    Calcium 8.7 - 10.5 mg/dL 9.0  9.2  9.0    Total Protein 6.0 - 8.4 g/dL 7.7  8.0  8.3    Albumin 3.5 - 5.2 g/dL 3.6  3.3   3.6    Total Bilirubin 0.1 - 1.0 mg/dL 0.2  0.3CM  0.3CM    Alkaline Phosphatase 55 - 135 U/L 177   232   210     AST 10 - 40 U/L 29  42   29    ALT 10 - 44 U/L 30  38  26    Anion Gap 8 - 16 mmol/L 9  10  8    eGFR if African American >60 mL/min/1.73 m^2 >60.0  >60.0  >60.0    eGFR if non African American >60 mL/min/1.73 m^2 >60.0  >60.0CM             Assessment / plan :       Discussed diagnosis, its evaluation, treatment and usual course. All questions answered.    Problem List Items Addressed This Visit        Pulmonary    Mixed restrictive and obstructive lung disease    Current Assessment & Plan     MIXED COPD ROS: taking medications as instructed, no medication side effects noted, no significant ongoing wheezing or shortness of breath, using bronchodilator MDI less than twice a week.   New concerns: None.   Exam: appears well, vitals normal, no  respiratory distress, acyanotic, normal RR, chest clear to auscultation, no wheezes, rales or rhonchi, symmetric air entry.   Assessment:  MIXED COPD stable.   Plan: BREO,PROVENTIL, DUONEB. PROVENTIL REFILLED. Compliance encouraged. Current treatment plan is effective, no change in therapy. CXR in 6 months.           Relevant Medications    albuterol-ipratropium 2.5mg-0.5mg/3mL (DUO-NEB) 0.5 mg-3 mg(2.5 mg base)/3 mL nebulizer solution    Other Relevant Orders    X-Ray Chest PA And Lateral       Immunology/Multi System    Sarcoidosis - Primary    Current Assessment & Plan     Stable and controlled. CONTINUE  IMURAN.            Relevant Orders    X-Ray Chest PA And Lateral               TIME SPENT WITH PATIENT: Time spent: 30 minutes in face to face  discussion concerning diagnosis, prognosis, review of lab and test results, benefits of treatment as well as management of disease, counseling of patient and coordination of care between various health  care providers . Greater than half the time spent was used for coordination of care and counseling of patient.     Follow-up in about 6 months (around 11/8/2018) for Mixed obstructive and restrictive lung disease, Sarcoidosis.

## 2018-05-08 NOTE — PATIENT INSTRUCTIONS
Lung Anatomy  Your lungs take air in to give your body oxygen, which the body needs to work. Your lungs, like all the tissues in your body, are made up of billions of tiny specialized cells. Old lung cells die and are replaced by new, identical lung cells. This natural process helps ensure healthy lungs.    Date Last Reviewed: 11/1/2016  © 3603-5257 [employer]. [address] [place] [address], [place] [address]. All rights reserved. This information is not intended as a substitute for professional medical care. Always follow your healthcare professional's instructions.

## 2018-11-12 ENCOUNTER — OFFICE VISIT (OUTPATIENT)
Dept: PULMONOLOGY | Facility: CLINIC | Age: 68
End: 2018-11-12
Payer: MEDICARE

## 2018-11-12 ENCOUNTER — HOSPITAL ENCOUNTER (OUTPATIENT)
Dept: RADIOLOGY | Facility: HOSPITAL | Age: 68
Discharge: HOME | End: 2018-11-12
Attending: INTERNAL MEDICINE
Payer: MEDICARE

## 2018-11-12 VITALS
WEIGHT: 179.38 LBS | RESPIRATION RATE: 18 BRPM | HEART RATE: 60 BPM | HEIGHT: 64 IN | BODY MASS INDEX: 30.63 KG/M2 | DIASTOLIC BLOOD PRESSURE: 74 MMHG | SYSTOLIC BLOOD PRESSURE: 152 MMHG | OXYGEN SATURATION: 98 %

## 2018-11-12 DIAGNOSIS — J43.9 MIXED RESTRICTIVE AND OBSTRUCTIVE LUNG DISEASE: Chronic | ICD-10-CM

## 2018-11-12 DIAGNOSIS — J98.4 MIXED RESTRICTIVE AND OBSTRUCTIVE LUNG DISEASE: Chronic | ICD-10-CM

## 2018-11-12 DIAGNOSIS — J43.9 MIXED RESTRICTIVE AND OBSTRUCTIVE LUNG DISEASE: Primary | Chronic | ICD-10-CM

## 2018-11-12 DIAGNOSIS — D86.9 SARCOIDOSIS: Chronic | ICD-10-CM

## 2018-11-12 DIAGNOSIS — J98.4 MIXED RESTRICTIVE AND OBSTRUCTIVE LUNG DISEASE: Primary | Chronic | ICD-10-CM

## 2018-11-12 PROCEDURE — 71046 X-RAY EXAM CHEST 2 VIEWS: CPT | Mod: TC

## 2018-11-12 PROCEDURE — 99999 PR PBB SHADOW E&M-EST. PATIENT-LVL III: CPT | Mod: PBBFAC,,, | Performed by: INTERNAL MEDICINE

## 2018-11-12 PROCEDURE — 99214 OFFICE O/P EST MOD 30 MIN: CPT | Mod: 25,S$GLB,, | Performed by: INTERNAL MEDICINE

## 2018-11-12 PROCEDURE — 90662 IIV NO PRSV INCREASED AG IM: CPT | Mod: S$GLB,,, | Performed by: INTERNAL MEDICINE

## 2018-11-12 PROCEDURE — G0008 ADMIN INFLUENZA VIRUS VAC: HCPCS | Mod: S$GLB,,, | Performed by: INTERNAL MEDICINE

## 2018-11-12 PROCEDURE — 3078F DIAST BP <80 MM HG: CPT | Mod: CPTII,S$GLB,, | Performed by: INTERNAL MEDICINE

## 2018-11-12 PROCEDURE — 1101F PT FALLS ASSESS-DOCD LE1/YR: CPT | Mod: CPTII,S$GLB,, | Performed by: INTERNAL MEDICINE

## 2018-11-12 PROCEDURE — 71046 X-RAY EXAM CHEST 2 VIEWS: CPT | Mod: 26,,, | Performed by: RADIOLOGY

## 2018-11-12 PROCEDURE — 3077F SYST BP >= 140 MM HG: CPT | Mod: CPTII,S$GLB,, | Performed by: INTERNAL MEDICINE

## 2018-11-12 NOTE — ASSESSMENT & PLAN NOTE
MIXED COPD ROS: taking medications as instructed, no medication side effects noted, no significant ongoing wheezing or shortness of breath, using bronchodilator MDI less than twice a week.   New concerns: None.   Exam: appears well, vitals normal, no respiratory distress, acyanotic, normal RR, chest clear to auscultation, no wheezes, rales or rhonchi, symmetric air entry.   Assessment:  MIXED COPD stable.   Plan: BREO,PROVENTIL, DUONEB. . Current treatment plan is effective, no change in therapy. PFT in 6 months.

## 2018-11-12 NOTE — PATIENT INSTRUCTIONS
Lung Anatomy  Your lungs take air in to give your body oxygen, which the body needs to work. Your lungs, like all the tissues in your body, are made up of billions of tiny specialized cells. Old lung cells die and are replaced by new, identical lung cells. This natural process helps ensure healthy lungs.    Date Last Reviewed: 11/1/2016  © 4705-6331 [employer]. [address] [place] [address], [place] [address]. All rights reserved. This information is not intended as a substitute for professional medical care. Always follow your healthcare professional's instructions.

## 2018-11-12 NOTE — PROGRESS NOTES
Subjective:      Patient ID: Coral Gamboa is a 68 y.o. female.  Patient Active Problem List   Diagnosis    Mixed restrictive and obstructive lung disease    Sarcoidosis     Problem list has been reviewed.    Chief Complaint: Sarcoidosis and Mixed restrictive and obsrtructive lung disease    HPI    Group Spirometric Classification Exacerbations / year mMRC CAT   Group B: Low risk; more symptoms  GOLD 1- 2  < = 1 >= 2 >=10       FEV1 = 1.12 L ( 57% predicted)    She reports persistent intermittent cough  She Imuran 50 mg PO daily. She is adherent with her IMURAN  Her current respiratory therapy regimen: BREO ELLIPTA and DUONEB which provides relief. She is adherent with her regimen.      COPD QUESTIONNAIRE  How often do you cough?: Most of the time  How often do you have phlegm (mucus) in your chest?: Most of the time  How often does your chest feel tight?: Some of the time  When you walk up a hill or one flight of stairs, how often are you breathless?: All of the time  How often are you limited doing any activities at home?: Almost never  How often are you confident leaving the house despite your lung condition?: Some of the time  How often do you sleep soundly?: Some of the time  How often do you have energy?: Almost never  Total score: 25       Immunization status reviewed and updated.    A full  review of systems, past , family  and social histories was performed except as mentioned in the note above, these are non contributory to the main issues discussed today.      Previous Report Reviewed: office notes     The following portions of the patient's history were reviewed and updated as appropriate: She  has a past medical history of Diabetes mellitus, GERD (gastroesophageal reflux disease), Hypertension, Sarcoidosis, and Sarcoidosis.  She  has a past surgical history that includes Tubal ligation; Tubal ligation; and Eye surgery.  Her family history is not on file.  She  reports that she has quit smoking. she  "has never used smokeless tobacco. She reports that she does not drink alcohol or use drugs.  She has a current medication list which includes the following prescription(s): albuterol, albuterol-ipratropium, amitriptyline, amlodipine-olmesartan, aspirin, azathioprine, clotrimazole-betamethasone 1-0.05%, fluticasone-vilanterol, hydrocodone-acetaminophen, omeprazole, and sitagliptin.  She is allergic to penicillins and sulfa (sulfonamide antibiotics)..    Review of Systems   Constitutional: Negative for fever, chills, activity change and fatigue.   HENT: Positive for trouble swallowing and congestion. Negative for postnasal drip, sinus pressure and hearing loss.    Respiratory: Positive for cough, wheezing and use of rescue inhaler. Negative for hemoptysis and sputum production.    Cardiovascular: Negative for chest pain, palpitations and leg swelling.   Genitourinary: Negative for difficulty urinating and hematuria.   Endocrine: Negative for cold intolerance and heat intolerance.    Musculoskeletal: Negative for back pain and joint swelling.   Skin: Negative for rash.   Gastrointestinal: Negative for nausea, vomiting, abdominal pain and abdominal distention.   Neurological: Negative for dizziness and light-headedness.   Hematological: Negative for adenopathy.   Psychiatric/Behavioral: Negative for confusion. The patient is not nervous/anxious.       Objective:     BP (!) 152/74   Pulse 60   Resp 18   Ht 5' 4" (1.626 m)   Wt 81.4 kg (179 lb 5.5 oz)   SpO2 98%   BMI 30.78 kg/m²   Body mass index is 30.78 kg/m².    Physical Exam   Constitutional: She is oriented to person, place, and time. She appears well-developed and well-nourished.   HENT:   Head: Normocephalic and atraumatic.   Eyes: EOM are normal. Pupils are equal, round, and reactive to light.   Neck: Normal range of motion. Neck supple.   Cardiovascular: Normal rate and regular rhythm.   Pulmonary/Chest: Effort normal and breath sounds normal.   Abdominal: " Soft. Bowel sounds are normal.   Neurological: She is alert and oriented to person, place, and time.   Skin: Skin is warm and dry.   Psychiatric: She has a normal mood and affect. Her behavior is normal.   Nursing note and vitals reviewed.      Personal Diagnostic Review    CXR: 11/12/18:  Lungs are clear without focal consolidation.  No pleural effusion.  Hilar structures are unremarkable.  Cardiomediastinal silhouette and osseous structures are unchanged      Assessment / plan :       Discussed diagnosis, its evaluation, treatment and usual course. All questions answered.    Problem List Items Addressed This Visit        Pulmonary    Mixed restrictive and obstructive lung disease - Primary    Current Assessment & Plan     MIXED COPD ROS: taking medications as instructed, no medication side effects noted, no significant ongoing wheezing or shortness of breath, using bronchodilator MDI less than twice a week.   New concerns: None.   Exam: appears well, vitals normal, no respiratory distress, acyanotic, normal RR, chest clear to auscultation, no wheezes, rales or rhonchi, symmetric air entry.   Assessment:  MIXED COPD stable.   Plan: BREO,PROVENTIL, J[place]. . Current treatment plan is effective, no change in therapy. PFT in 6 months.           Relevant Orders    Complete PFT with bronchodilator       Immunology/Multi System    Sarcoidosis    Current Assessment & Plan     Stable and controlled. CONTINUE  IMURAN. CMP, CBC. ORDERS IN EMR.             Relevant Orders    Comprehensive metabolic panel    CBC auto differential    Angiotensin converting enzyme               TIME SPENT WITH PATIENT: Time spent: 30 minutes in face to face  discussion concerning diagnosis, prognosis, review of lab and test results, benefits of treatment as well as management of disease, counseling of patient and coordination of care between various health  care providers . Greater than half the time spent was used for coordination of care and  counseling of patient.     Follow-up in about 6 months (around 5/12/2019) for Mixed obstructive and restrictive lung disease, Sarcoidosis.

## 2019-03-01 ENCOUNTER — HOSPITAL ENCOUNTER (EMERGENCY)
Facility: HOSPITAL | Age: 69
Discharge: HOME | End: 2019-03-01
Attending: EMERGENCY MEDICINE
Payer: MEDICARE

## 2019-03-01 VITALS
DIASTOLIC BLOOD PRESSURE: 70 MMHG | HEART RATE: 76 BPM | HEIGHT: 64 IN | SYSTOLIC BLOOD PRESSURE: 139 MMHG | BODY MASS INDEX: 29.66 KG/M2 | TEMPERATURE: 98 F | OXYGEN SATURATION: 97 % | RESPIRATION RATE: 17 BRPM | WEIGHT: 173.75 LBS

## 2019-03-01 DIAGNOSIS — R10.9 FLANK PAIN: Primary | ICD-10-CM

## 2019-03-01 LAB
ALBUMIN SERPL BCP-MCNC: 3.9 G/DL
ALP SERPL-CCNC: 185 U/L
ALT SERPL W/O P-5'-P-CCNC: 28 U/L
ANION GAP SERPL CALC-SCNC: 8 MMOL/L
AST SERPL-CCNC: 26 U/L
BACTERIA #/AREA URNS HPF: NORMAL /HPF
BASOPHILS # BLD AUTO: 0.03 K/UL
BASOPHILS NFR BLD: 0.3 %
BILIRUB SERPL-MCNC: 0.3 MG/DL
BILIRUB UR QL STRIP: NEGATIVE
BUN SERPL-MCNC: 17 MG/DL
CALCIUM SERPL-MCNC: 9.6 MG/DL
CHLORIDE SERPL-SCNC: 104 MMOL/L
CLARITY UR: CLEAR
CO2 SERPL-SCNC: 30 MMOL/L
COLOR UR: YELLOW
CREAT SERPL-MCNC: 1 MG/DL
DIFFERENTIAL METHOD: ABNORMAL
EOSINOPHIL # BLD AUTO: 0.1 K/UL
EOSINOPHIL NFR BLD: 0.9 %
ERYTHROCYTE [DISTWIDTH] IN BLOOD BY AUTOMATED COUNT: 15.6 %
EST. GFR  (AFRICAN AMERICAN): >60 ML/MIN/1.73 M^2
EST. GFR  (NON AFRICAN AMERICAN): 58 ML/MIN/1.73 M^2
GLUCOSE SERPL-MCNC: 164 MG/DL
GLUCOSE UR QL STRIP: NEGATIVE
HCT VFR BLD AUTO: 39.3 %
HGB BLD-MCNC: 12.3 G/DL
HGB UR QL STRIP: ABNORMAL
KETONES UR QL STRIP: NEGATIVE
LEUKOCYTE ESTERASE UR QL STRIP: NEGATIVE
LIPASE SERPL-CCNC: 34 U/L
LYMPHOCYTES # BLD AUTO: 3.3 K/UL
LYMPHOCYTES NFR BLD: 33.3 %
MCH RBC QN AUTO: 26.1 PG
MCHC RBC AUTO-ENTMCNC: 31.3 G/DL
MCV RBC AUTO: 83 FL
MICROSCOPIC COMMENT: NORMAL
MONOCYTES # BLD AUTO: 0.9 K/UL
MONOCYTES NFR BLD: 8.8 %
NEUTROPHILS # BLD AUTO: 5.7 K/UL
NEUTROPHILS NFR BLD: 56.7 %
NITRITE UR QL STRIP: NEGATIVE
PH UR STRIP: 6 [PH] (ref 5–8)
PLATELET # BLD AUTO: 245 K/UL
PMV BLD AUTO: 11.5 FL
POTASSIUM SERPL-SCNC: 3.9 MMOL/L
PROT SERPL-MCNC: 8.2 G/DL
PROT UR QL STRIP: ABNORMAL
RBC # BLD AUTO: 4.72 M/UL
RBC #/AREA URNS HPF: 3 /HPF (ref 0–4)
SODIUM SERPL-SCNC: 142 MMOL/L
SP GR UR STRIP: >=1.03 (ref 1–1.03)
URN SPEC COLLECT METH UR: ABNORMAL
UROBILINOGEN UR STRIP-ACNC: NEGATIVE EU/DL
WBC # BLD AUTO: 10.04 K/UL
WBC #/AREA URNS HPF: 3 /HPF (ref 0–5)

## 2019-03-01 PROCEDURE — 81000 URINALYSIS NONAUTO W/SCOPE: CPT

## 2019-03-01 PROCEDURE — 85025 COMPLETE CBC W/AUTO DIFF WBC: CPT

## 2019-03-01 PROCEDURE — 83690 ASSAY OF LIPASE: CPT

## 2019-03-01 PROCEDURE — 96374 THER/PROPH/DIAG INJ IV PUSH: CPT

## 2019-03-01 PROCEDURE — 63600175 PHARM REV CODE 636 W HCPCS: Performed by: EMERGENCY MEDICINE

## 2019-03-01 PROCEDURE — 86803 HEPATITIS C AB TEST: CPT

## 2019-03-01 PROCEDURE — 99284 EMERGENCY DEPT VISIT MOD MDM: CPT | Mod: 25

## 2019-03-01 PROCEDURE — 80053 COMPREHEN METABOLIC PANEL: CPT

## 2019-03-01 RX ORDER — KETOROLAC TROMETHAMINE 30 MG/ML
10 INJECTION, SOLUTION INTRAMUSCULAR; INTRAVENOUS
Status: DISCONTINUED | OUTPATIENT
Start: 2019-03-01 | End: 2019-03-01

## 2019-03-01 RX ORDER — KETOROLAC TROMETHAMINE 30 MG/ML
10 INJECTION, SOLUTION INTRAMUSCULAR; INTRAVENOUS
Status: COMPLETED | OUTPATIENT
Start: 2019-03-01 | End: 2019-03-01

## 2019-03-01 RX ADMIN — KETOROLAC TROMETHAMINE 10 MG: 30 INJECTION, SOLUTION INTRAMUSCULAR; INTRAVENOUS at 10:03

## 2019-03-02 NOTE — ED PROVIDER NOTES
"SCRIBE #1 NOTE: I, Linette Kleber, am scribing for, and in the presence of, Bill Nesbitt MD. I have scribed the entire note.         History     Chief Complaint   Patient presents with    Flank Pain     states right flank pain with bladder leakage, denies dysuria for a few weeks.  Also c/o cough, congestion, sore throat and loss of voice today       Review of patient's allergies indicates:   Allergen Reactions    Penicillins Rash    Sulfa (sulfonamide antibiotics) Rash         History of Present Illness   HPI    3/1/2019, 10:15 PM  History obtained from the patient      History of Present Illness: Coral Gamboa is a 68 y.o. female patient with PMHx of DM, HTN, and sarcoidosis who presents to the Emergency Department for R sided flank pain which onset gradually 2 weeks ago. Symptoms are intermittent and 8/10 in severity. No mitigating or exacerbating factors reported. Patient also c/o urinary incontinence described as "I can't hold my urine" that onset 4-5 months ago. Patient states she has been evaluated by her PCP twice for the incontinence. Patient denies any fever, chills, abdominal pain, dysuria, hematuria, urinary frequency/urgency, N/V, extremity weakness/numbness, and all other sxs at this time. Prior tx includes Tylenol with minimal relief. Patient states she had a colonoscopy a month ago that showed 2 polyps which were removed at that time. No further complaints or concerns at this time.     Arrival mode: Personal vehicle      PCP: Ravindra Rincon MD        Past Medical History:  Past Medical History:   Diagnosis Date    Diabetes mellitus     GERD (gastroesophageal reflux disease)     Hypertension     Sarcoidosis     Sarcoidosis        Past Surgical History:  Past Surgical History:   Procedure Laterality Date    EYE SURGERY      TUBAL LIGATION      TUBAL LIGATION      Reversal         Family History:  History reviewed. No pertinent family history.    Social History:  Social History "     Tobacco Use    Smoking status: Former Smoker    Smokeless tobacco: Never Used   Substance and Sexual Activity    Alcohol use: No    Drug use: No    Sexual activity: Unknown        Review of Systems   Review of Systems   Constitutional: Negative for chills and fever.   HENT: Negative for sore throat.    Respiratory: Negative for shortness of breath.    Cardiovascular: Negative for chest pain.   Gastrointestinal: Negative for abdominal pain, nausea and vomiting.   Genitourinary: Positive for flank pain (R sided). Negative for dysuria, frequency, hematuria and urgency.   Musculoskeletal: Negative for back pain.   Skin: Negative for rash.   Neurological: Negative for weakness and numbness.        (+) urinary incontinence   Hematological: Does not bruise/bleed easily.   All other systems reviewed and are negative.       Physical Exam     Initial Vitals [03/01/19 2201]   BP Pulse Resp Temp SpO2   (!) 144/71 73 (!) 24 98.2 °F (36.8 °C) 98 %      MAP       --          Physical Exam  Nursing Notes and Vital Signs Reviewed.  Constitutional: Patient is in no acute distress. Well-developed and well-nourished.  Head: Atraumatic. Normocephalic.  Eyes: PERRL. EOM intact. Conjunctivae are not pale. No scleral icterus.  ENT: Mucous membranes are moist. Oropharynx is clear and symmetric.    Neck: Supple. Full ROM. No lymphadenopathy.  Cardiovascular: Regular rate. Regular rhythm. No murmurs, rubs, or gallops. Distal pulses are 2+ and symmetric.  Pulmonary/Chest: No respiratory distress. Clear to auscultation bilaterally. No wheezing or rales.  Abdominal: Soft and non-distended.  There is no tenderness.  No rebound, guarding, or rigidity. Good bowel sounds.  Genitourinary: No CVA tenderness  Musculoskeletal: Moves all extremities. No obvious deformities. No edema. No calf tenderness.  Skin: Warm and dry.  Neurological:  Alert, awake, and appropriate.  Normal speech.  No acute focal neurological deficits are  "appreciated.  Psychiatric: Normal affect. Good eye contact. Appropriate in content.     ED Course   Procedures  ED Vital Signs:  Vitals:    03/01/19 2201 03/01/19 2300 03/01/19 2345   BP: (!) 144/71 136/68 139/70   Pulse: 73 82 76   Resp: (!) 24 18 17   Temp: 98.2 °F (36.8 °C)     TempSrc: Oral     SpO2: 98% 99% 97%   Weight: 78.8 kg (173 lb 11.6 oz)     Height: 5' 4" (1.626 m)         Abnormal Lab Results:  Labs Reviewed   COMPREHENSIVE METABOLIC PANEL - Abnormal; Notable for the following components:       Result Value    CO2 30 (*)     Glucose 164 (*)     Alkaline Phosphatase 185 (*)     eGFR if non  58 (*)     All other components within normal limits   CBC W/ AUTO DIFFERENTIAL - Abnormal; Notable for the following components:    MCH 26.1 (*)     MCHC 31.3 (*)     RDW 15.6 (*)     All other components within normal limits   URINALYSIS, REFLEX TO URINE CULTURE - Abnormal; Notable for the following components:    Specific Gravity, UA >=1.030 (*)     Protein, UA Trace (*)     Occult Blood UA 1+ (*)     All other components within normal limits    Narrative:     Preferred Collection Type->Urine, Clean Catch   LIPASE   URINALYSIS MICROSCOPIC    Narrative:     Preferred Collection Type->Urine, Clean Catch   HEPATITIS C ANTIBODY        All Lab Results:  Results for orders placed or performed during the hospital encounter of 03/01/19   Comprehensive metabolic panel   Result Value Ref Range    Sodium 142 136 - 145 mmol/L    Potassium 3.9 3.5 - 5.1 mmol/L    Chloride 104 95 - 110 mmol/L    CO2 30 (H) 23 - 29 mmol/L    Glucose 164 (H) 70 - 110 mg/dL    BUN, Bld 17 8 - 23 mg/dL    Creatinine 1.0 0.5 - 1.4 mg/dL    Calcium 9.6 8.7 - 10.5 mg/dL    Total Protein 8.2 6.0 - 8.4 g/dL    Albumin 3.9 3.5 - 5.2 g/dL    Total Bilirubin 0.3 0.1 - 1.0 mg/dL    Alkaline Phosphatase 185 (H) 55 - 135 U/L    AST 26 10 - 40 U/L    ALT 28 10 - 44 U/L    Anion Gap 8 8 - 16 mmol/L    eGFR if African American >60 >60 mL/min/1.73 " m^2    eGFR if non African American 58 (A) >60 mL/min/1.73 m^2   CBC auto differential   Result Value Ref Range    WBC 10.04 3.90 - 12.70 K/uL    RBC 4.72 4.00 - 5.40 M/uL    Hemoglobin 12.3 12.0 - 16.0 g/dL    Hematocrit 39.3 37.0 - 48.5 %    MCV 83 82 - 98 fL    MCH 26.1 (L) 27.0 - 31.0 pg    MCHC 31.3 (L) 32.0 - 36.0 g/dL    RDW 15.6 (H) 11.5 - 14.5 %    Platelets 245 150 - 350 K/uL    MPV 11.5 9.2 - 12.9 fL    Gran # (ANC) 5.7 1.8 - 7.7 K/uL    Lymph # 3.3 1.0 - 4.8 K/uL    Mono # 0.9 0.3 - 1.0 K/uL    Eos # 0.1 0.0 - 0.5 K/uL    Baso # 0.03 0.00 - 0.20 K/uL    Gran% 56.7 38.0 - 73.0 %    Lymph% 33.3 18.0 - 48.0 %    Mono% 8.8 4.0 - 15.0 %    Eosinophil% 0.9 0.0 - 8.0 %    Basophil% 0.3 0.0 - 1.9 %    Differential Method Automated    Urinalysis, Reflex to Urine Culture Urine, Clean Catch   Result Value Ref Range    Specimen UA Urine, Clean Catch     Color, UA Yellow Yellow, Straw, Ileana    Appearance, UA Clear Clear    pH, UA 6.0 5.0 - 8.0    Specific Gravity, UA >=1.030 (A) 1.005 - 1.030    Protein, UA Trace (A) Negative    Glucose, UA Negative Negative    Ketones, UA Negative Negative    Bilirubin (UA) Negative Negative    Occult Blood UA 1+ (A) Negative    Nitrite, UA Negative Negative    Urobilinogen, UA Negative <2.0 EU/dL    Leukocytes, UA Negative Negative   Lipase   Result Value Ref Range    Lipase 34 4 - 60 U/L   Urinalysis Microscopic   Result Value Ref Range    RBC, UA 3 0 - 4 /hpf    WBC, UA 3 0 - 5 /hpf    Bacteria, UA Occasional None-Occ /hpf    Microscopic Comment SEE COMMENT        Imaging Results:  Imaging Results          CT Abdomen Pelvis  Without Contrast (Final result)  Result time 03/01/19 23:09:52    Final result by Phan Landaverde MD (03/01/19 23:09:52)                 Impression:      No acute abnormality identified.    Moderate to large volume stool cecum, right colon, transverse colon, splenic flexure colon.      Electronically signed by: Phan  Akhil  Date:    03/01/2019  Time:    23:09             Narrative:    EXAMINATION:  CT ABDOMEN PELVIS WITHOUT CONTRAST    CLINICAL HISTORY:  left flank pain with associated urinary incontinence;    TECHNIQUE:  Low dose axial images, sagittal and coronal reformations were obtained from the lung bases to the pubic symphysis, Oral contrast was not administered.  All CT scans at this location are performed using dose modulation techniques as appropriate to a performed exam including the following: Automated exposure control; adjustment of the mA and/or kV  according to patient size.    COMPARISON:  Chest CT 05/11/2016    FINDINGS:  Heart: Normal in size. No pericardial effusion.    Lung Bases: Well aerated, without consolidation or pleural fluid.    Liver: Normal in size and attenuation, with no focal hepatic lesions.    Gallbladder: No calcified gallstones.    Bile Ducts: No evidence of dilated ducts.    Pancreas: No mass or peripancreatic fat stranding.    Spleen: Unremarkable.    Adrenals: Unremarkable.    Kidneys/ Ureters: Unremarkable.    Bladder: No evidence of wall thickening.  Collapsed.    Reproductive organs: Unremarkable.    GI Tract/Mesentery: No evidence of bowel obstruction or inflammation.  Moderate to large volume stool cecum, right colon, transverse colon, splenic flexure colon.    Peritoneal Space: No ascites. No free air.    Retroperitoneum: No significant adenopathy.  Phleboliths in the pelvis.    Abdominal wall: Unremarkable.    Vasculature: No significant atherosclerosis or aneurysm.    Bones: No acute fracture.  Degenerative changes lumbar spine most notable at L5-S1.                                           The Emergency Provider reviewed the vital signs and test results, which are outlined above.     ED Discussion     11:45 PM: Per nurse, bladder scan was 0mL. Reassessed pt at this time. Discussed with pt all pertinent ED information and results. Discussed pt dx and plan of tx. Gave pt all f/u  and return to the ED instructions. All questions and concerns were addressed at this time. Pt expresses understanding of information and instructions, and is comfortable with plan to discharge. Pt is stable for discharge.    I discussed with patient and/or family/caretaker that evaluation in the ED does not suggest any emergent or life threatening medical conditions requiring immediate intervention beyond what was provided in the ED, and I believe patient is safe for discharge.  Regardless, an unremarkable evaluation in the ED does not preclude the development or presence of a serious of life threatening condition. As such, patient was instructed to return immediately for any worsening or change in current symptoms.          ED Medication(s):  Medications   ketorolac injection 9.999 mg (9.999 mg Intravenous Given 3/1/19 3328)     New Prescriptions    No medications on file       Follow-up Information     Schedule an appointment as soon as possible for a visit  with Ravindra Rincon MD.    Specialty:  Family Medicine  Why:  As needed  Contact information:  [address] [facility] [address]  [phone]             Go to  [facility]    Specialty:  Emergency Medicine  Why:  As needed, If symptoms worsen  Contact information:  [address] [facility] [place] [address] [phone]                      Medical Decision Making     Medical Decision Making:   Clinical Tests:   Lab Tests: Ordered and Reviewed  Radiological Study: Ordered and Reviewed             Scribe Attestation:   Scribe #1: I performed the above scribed service and the documentation accurately describes the services I performed. I attest to the accuracy of the note.     Attending:   Physician Attestation Statement for Scribe #1: I, Bill Nesbitt MD, personally performed the services described in this documentation, as scribed by Linette Shahid, in my presence, and it is both accurate and complete.            Clinical Impression       ICD-10-CM ICD-9-CM   1. Flank pain R10.9 789.09       Disposition:   Disposition: Discharged  Condition: Stable         Bill Nesbitt MD  03/01/19 6106

## 2019-03-04 LAB — HCV AB SERPL QL IA: NEGATIVE

## 2019-05-14 ENCOUNTER — CLINICAL SUPPORT (OUTPATIENT)
Dept: PULMONOLOGY | Facility: CLINIC | Age: 69
End: 2019-05-14
Payer: MEDICARE

## 2019-05-14 ENCOUNTER — OFFICE VISIT (OUTPATIENT)
Dept: PULMONOLOGY | Facility: CLINIC | Age: 69
End: 2019-05-14
Payer: MEDICARE

## 2019-05-14 ENCOUNTER — LAB VISIT (OUTPATIENT)
Dept: LAB | Facility: HOSPITAL | Age: 69
End: 2019-05-14
Attending: INTERNAL MEDICINE
Payer: MEDICARE

## 2019-05-14 VITALS
WEIGHT: 170.44 LBS | HEART RATE: 66 BPM | OXYGEN SATURATION: 98 % | DIASTOLIC BLOOD PRESSURE: 80 MMHG | SYSTOLIC BLOOD PRESSURE: 120 MMHG | RESPIRATION RATE: 18 BRPM | BODY MASS INDEX: 29.1 KG/M2 | HEIGHT: 64 IN

## 2019-05-14 DIAGNOSIS — D86.9 SARCOIDOSIS: Chronic | ICD-10-CM

## 2019-05-14 DIAGNOSIS — J98.4 MIXED RESTRICTIVE AND OBSTRUCTIVE LUNG DISEASE: Chronic | ICD-10-CM

## 2019-05-14 DIAGNOSIS — J43.9 MIXED RESTRICTIVE AND OBSTRUCTIVE LUNG DISEASE: Primary | Chronic | ICD-10-CM

## 2019-05-14 DIAGNOSIS — J98.4 MIXED RESTRICTIVE AND OBSTRUCTIVE LUNG DISEASE: Primary | Chronic | ICD-10-CM

## 2019-05-14 DIAGNOSIS — J43.9 MIXED RESTRICTIVE AND OBSTRUCTIVE LUNG DISEASE: Chronic | ICD-10-CM

## 2019-05-14 LAB
ALBUMIN SERPL BCP-MCNC: 3.9 G/DL (ref 3.5–5.2)
ALBUMIN SERPL BCP-MCNC: 3.9 G/DL (ref 3.5–5.2)
ALP SERPL-CCNC: 158 U/L (ref 55–135)
ALP SERPL-CCNC: 158 U/L (ref 55–135)
ALT SERPL W/O P-5'-P-CCNC: 17 U/L (ref 10–44)
ALT SERPL W/O P-5'-P-CCNC: 17 U/L (ref 10–44)
ANION GAP SERPL CALC-SCNC: 14 MMOL/L (ref 8–16)
AST SERPL-CCNC: 21 U/L (ref 10–40)
AST SERPL-CCNC: 21 U/L (ref 10–40)
BILIRUB DIRECT SERPL-MCNC: 0.2 MG/DL (ref 0.1–0.3)
BILIRUB SERPL-MCNC: 0.3 MG/DL (ref 0.1–1)
BILIRUB SERPL-MCNC: 0.3 MG/DL (ref 0.1–1)
BRPFT: ABNORMAL
BUN SERPL-MCNC: 10 MG/DL (ref 8–23)
CALCIUM SERPL-MCNC: 9.7 MG/DL (ref 8.7–10.5)
CHLORIDE SERPL-SCNC: 104 MMOL/L (ref 95–110)
CO2 SERPL-SCNC: 24 MMOL/L (ref 23–29)
CREAT SERPL-MCNC: 0.8 MG/DL (ref 0.5–1.4)
DLCO ADJ PRE: 9.02 ML/(MIN*MMHG) (ref 16.1–27.56)
DLCO SINGLE BREATH LLN: 16.1
DLCO SINGLE BREATH PRE REF: 41.3 %
DLCO SINGLE BREATH REF: 21.83
DLCOC SBVA LLN: 2.94
DLCOC SBVA PRE REF: 79.2 %
DLCOC SBVA REF: 4.39
DLCOC SINGLE BREATH LLN: 16.1
DLCOC SINGLE BREATH PRE REF: 41.3 %
DLCOC SINGLE BREATH REF: 21.83
DLCOVA LLN: 2.94
DLCOVA PRE REF: 79.2 %
DLCOVA PRE: 3.48 ML/(MIN*MMHG*L) (ref 2.94–5.84)
DLCOVA REF: 4.39
DLVAADJ PRE: 3.48 ML/(MIN*MMHG*L) (ref 2.94–5.84)
ERV LLN: 0.7
ERV PRE REF: 80 %
ERV REF: 0.7
EST. GFR  (AFRICAN AMERICAN): >60 ML/MIN/1.73 M^2
EST. GFR  (NON AFRICAN AMERICAN): >60 ML/MIN/1.73 M^2
FEF 25 75 CHG: 34.3 %
FEF 25 75 LLN: 0.67
FEF 25 75 POST REF: 34.2 %
FEF 25 75 PRE REF: 25.5 %
FEF 25 75 REF: 1.73
FET100 CHG: -20.3 %
FEV1 CHG: 4.6 %
FEV1 FVC CHG: 7.8 %
FEV1 FVC LLN: 66
FEV1 FVC POST REF: 78.1 %
FEV1 FVC PRE REF: 72.5 %
FEV1 FVC REF: 79
FEV1 LLN: 1.39
FEV1 POST REF: 65.4 %
FEV1 PRE REF: 62.5 %
FEV1 REF: 1.97
FEV6 CHG: 0.8 %
FEV6 LLN: 1.68
FEV6 POST REF: 80.8 %
FEV6 POST: 1.93 L (ref 1.68–3.09)
FEV6 PRE REF: 80.1 %
FEV6 PRE: 1.91 L (ref 1.68–3.09)
FEV6 REF: 2.39
FRCPLETH LLN: 1.9
FRCPLETH PREREF: 69.5 %
FRCPLETH REF: 2.72
FVC CHG: -3 %
FVC LLN: 1.79
FVC POST REF: 83.2 %
FVC PRE REF: 85.8 %
FVC REF: 2.51
GLUCOSE SERPL-MCNC: 111 MG/DL (ref 70–110)
IVC PRE: 1.74 L (ref 1.79–3.23)
IVC SINGLE BREATH LLN: 1.79
IVC SINGLE BREATH PRE REF: 69.2 %
IVC SINGLE BREATH REF: 2.51
MVV LLN: 74
MVV PRE REF: 55.5 %
MVV REF: 87
PEF CHG: 13.2 %
PEF LLN: 3.11
PEF POST REF: 81.6 %
PEF PRE REF: 72.1 %
PEF REF: 5.12
POST FEF 25 75: 0.59 L/S (ref 0.67–2.78)
POST FET 100: 10.62 SEC
POST FEV1 FVC: 61.56 % (ref 66.15–91.42)
POST FEV1: 1.29 L (ref 1.39–2.55)
POST FVC: 2.09 L (ref 1.79–3.23)
POST PEF: 4.18 L/S (ref 3.11–7.14)
POTASSIUM SERPL-SCNC: 3.7 MMOL/L (ref 3.5–5.1)
PRE DLCO: 9.02 ML/(MIN*MMHG) (ref 16.1–27.56)
PRE ERV: 0.56 L (ref 0.7–0.7)
PRE FEF 25 75: 0.44 L/S (ref 0.67–2.78)
PRE FET 100: 13.33 SEC
PRE FEV1 FVC: 57.1 % (ref 66.15–91.42)
PRE FEV1: 1.23 L (ref 1.39–2.55)
PRE FRC PL: 1.89 L
PRE FVC: 2.15 L (ref 1.79–3.23)
PRE MVV: 48 L/MIN (ref 73.56–99.52)
PRE PEF: 3.69 L/S (ref 3.11–7.14)
PRE RV: 1.11 L (ref 1.45–2.6)
PRE TLC: 3.27 L (ref 3.98–5.96)
PROT SERPL-MCNC: 8.5 G/DL (ref 6–8.4)
PROT SERPL-MCNC: 8.5 G/DL (ref 6–8.4)
RAW LLN: 3.06
RAW PRE REF: 243.6 %
RAW PRE: 7.45 CMH2O*S/L (ref 3.06–3.06)
RAW REF: 3.06
RV LLN: 1.45
RV PRE REF: 55.1 %
RV REF: 2.02
RVTLC LLN: 32
RVTLC PRE REF: 81.7 %
RVTLC PRE: 34.11 % (ref 32.15–51.33)
RVTLC REF: 42
SODIUM SERPL-SCNC: 142 MMOL/L (ref 136–145)
TLC LLN: 3.98
TLC PRE REF: 65.8 %
TLC REF: 4.97
VA PRE: 2.35 L (ref 4.82–4.82)
VA SINGLE BREATH LLN: 4.82
VA SINGLE BREATH PRE REF: 48.7 %
VA SINGLE BREATH REF: 4.82
VC LLN: 1.79
VC PRE REF: 85.8 %
VC PRE: 2.15 L (ref 1.79–3.23)
VC REF: 2.51
VTGRAWPRE: 2.19 L

## 2019-05-14 PROCEDURE — 80053 COMPREHEN METABOLIC PANEL: CPT

## 2019-05-14 PROCEDURE — 99214 OFFICE O/P EST MOD 30 MIN: CPT | Mod: 25,S$GLB,, | Performed by: INTERNAL MEDICINE

## 2019-05-14 PROCEDURE — 36415 COLL VENOUS BLD VENIPUNCTURE: CPT

## 2019-05-14 PROCEDURE — 3074F PR MOST RECENT SYSTOLIC BLOOD PRESSURE < 130 MM HG: ICD-10-PCS | Mod: CPTII,S$GLB,, | Performed by: INTERNAL MEDICINE

## 2019-05-14 PROCEDURE — 1101F PT FALLS ASSESS-DOCD LE1/YR: CPT | Mod: CPTII,S$GLB,, | Performed by: INTERNAL MEDICINE

## 2019-05-14 PROCEDURE — 3074F SYST BP LT 130 MM HG: CPT | Mod: CPTII,S$GLB,, | Performed by: INTERNAL MEDICINE

## 2019-05-14 PROCEDURE — 99214 PR OFFICE/OUTPT VISIT, EST, LEVL IV, 30-39 MIN: ICD-10-PCS | Mod: 25,S$GLB,, | Performed by: INTERNAL MEDICINE

## 2019-05-14 PROCEDURE — 94729 DIFFUSING CAPACITY: CPT | Mod: S$GLB,,, | Performed by: INTERNAL MEDICINE

## 2019-05-14 PROCEDURE — 94060 EVALUATION OF WHEEZING: CPT | Mod: S$GLB,,, | Performed by: INTERNAL MEDICINE

## 2019-05-14 PROCEDURE — 3079F PR MOST RECENT DIASTOLIC BLOOD PRESSURE 80-89 MM HG: ICD-10-PCS | Mod: CPTII,S$GLB,, | Performed by: INTERNAL MEDICINE

## 2019-05-14 PROCEDURE — 1101F PR PT FALLS ASSESS DOC 0-1 FALLS W/OUT INJ PAST YR: ICD-10-PCS | Mod: CPTII,S$GLB,, | Performed by: INTERNAL MEDICINE

## 2019-05-14 PROCEDURE — 3079F DIAST BP 80-89 MM HG: CPT | Mod: CPTII,S$GLB,, | Performed by: INTERNAL MEDICINE

## 2019-05-14 PROCEDURE — 99999 PR PBB SHADOW E&M-EST. PATIENT-LVL III: ICD-10-PCS | Mod: PBBFAC,,, | Performed by: INTERNAL MEDICINE

## 2019-05-14 PROCEDURE — 99999 PR PBB SHADOW E&M-EST. PATIENT-LVL III: CPT | Mod: PBBFAC,,, | Performed by: INTERNAL MEDICINE

## 2019-05-14 PROCEDURE — 94729 PR C02/MEMBANE DIFFUSE CAPACITY: ICD-10-PCS | Mod: S$GLB,,, | Performed by: INTERNAL MEDICINE

## 2019-05-14 PROCEDURE — 94726 PLETHYSMOGRAPHY LUNG VOLUMES: CPT | Mod: S$GLB,,, | Performed by: INTERNAL MEDICINE

## 2019-05-14 PROCEDURE — 94060 PR EVAL OF BRONCHOSPASM: ICD-10-PCS | Mod: S$GLB,,, | Performed by: INTERNAL MEDICINE

## 2019-05-14 PROCEDURE — 94726 PULM FUNCT TST PLETHYSMOGRAP: ICD-10-PCS | Mod: S$GLB,,, | Performed by: INTERNAL MEDICINE

## 2019-05-14 RX ORDER — PROMETHAZINE HYDROCHLORIDE AND DEXTROMETHORPHAN HYDROBROMIDE 6.25; 15 MG/5ML; MG/5ML
5 SYRUP ORAL 4 TIMES DAILY PRN
Refills: 0 | COMMUNITY
Start: 2019-03-05 | End: 2021-07-09

## 2019-05-14 RX ORDER — AZATHIOPRINE 50 MG/1
50 TABLET ORAL DAILY
Qty: 30 TABLET | Refills: 6 | Status: SHIPPED | OUTPATIENT
Start: 2019-05-14 | End: 2020-01-07

## 2019-05-14 RX ORDER — IPRATROPIUM BROMIDE AND ALBUTEROL SULFATE 2.5; .5 MG/3ML; MG/3ML
3 SOLUTION RESPIRATORY (INHALATION) EVERY 6 HOURS PRN
Qty: 120 VIAL | Refills: 11 | Status: SHIPPED | OUTPATIENT
Start: 2019-05-14 | End: 2021-06-17 | Stop reason: SDUPTHER

## 2019-05-14 RX ORDER — FLUTICASONE FUROATE AND VILANTEROL 100; 25 UG/1; UG/1
1 POWDER RESPIRATORY (INHALATION) DAILY
Qty: 1 EACH | Refills: 11 | Status: SHIPPED | OUTPATIENT
Start: 2019-05-14 | End: 2021-06-17

## 2019-05-14 RX ORDER — ALBUTEROL SULFATE 90 UG/1
2 AEROSOL, METERED RESPIRATORY (INHALATION) EVERY 6 HOURS PRN
Qty: 18 G | Refills: 11 | Status: SHIPPED | OUTPATIENT
Start: 2019-05-14

## 2019-05-14 RX ORDER — ONDANSETRON 4 MG/1
4 TABLET, ORALLY DISINTEGRATING ORAL EVERY 12 HOURS PRN
Refills: 0 | COMMUNITY
Start: 2019-03-04

## 2019-05-14 NOTE — ASSESSMENT & PLAN NOTE
MIXED COPD ROS: taking medications as instructed, no medication side effects noted, no significant ongoing wheezing or shortness of breath, using bronchodilator MDI less than twice a week.   New concerns: None.   Exam: appears well, vitals normal, no respiratory distress, acyanotic, normal RR, chest clear to auscultation, no wheezes, rales or rhonchi, symmetric air entry.   Assessment:  MIXED COPD stable.   Plan: BREO,PROVENTIL, DUONEB. Current treatment plan is effective, no change in therapy. MEDS  Refilled. CXR in 6 months.

## 2019-05-14 NOTE — PROGRESS NOTES
Subjective:      Patient ID: Coral Gamboa is a 68 y.o. female.  Patient Active Problem List   Diagnosis    Mixed restrictive and obstructive lung disease    Sarcoidosis     Problem list has been reviewed.    Chief Complaint: Sarcoidosis and Mixed restrictive and obstructive lung disease    HPI    Group Spirometric Classification Exacerbations / year mMRC CAT   Group B: Low risk; more symptoms  GOLD 1- 2  < = 1 >= 2 >=10        FEV1: 1.23  (62.5 % predicted)    She reports persistent fatigue and stable exertional dyspnea.    She stopped taking her  Imuran 50 mg four months ago.  Her current respiratory therapy regimen: BREO ELLIPTA and DUONEB which provides relief. Her nebulizer no longer works. She is requesting a new nebulizer.       COPD QUESTIONNAIRE  How often do you cough?: (P) A little of the time  How often do you have phlegm (mucus) in your chest?: (P) A little of the time  How often does your chest feel tight?: (P) Never  When you walk up a hill or one flight of stairs, how often are you breathless?: (P) All of the time  How often are you limited doing any activities at home?: (P) Some of the time  How often are you confident leaving the house despite your lung condition?: (P) All of the time  How often do you sleep soundly?: (P) Almost never  How often do you have energy?: (P) Almost never  Total score: (P) 20       Immunization status reviewed and updated.    A full  review of systems, past , family  and social histories was performed except as mentioned in the note above, these are non contributory to the main issues discussed today.      Previous Report Reviewed: office notes     The following portions of the patient's history were reviewed and updated as appropriate: She  has a past medical history of Diabetes mellitus, GERD (gastroesophageal reflux disease), Hypertension, Sarcoidosis, and Sarcoidosis.  She  has a past surgical history that includes Tubal ligation; Tubal ligation; and Eye  "surgery.  Her family history is not on file.  She  reports that she has quit smoking. She has never used smokeless tobacco. She reports that she does not drink alcohol or use drugs.  She has a current medication list which includes the following prescription(s): albuterol, albuterol-ipratropium, amitriptyline, amlodipine-olmesartan, aspirin, azathioprine, clotrimazole-betamethasone 1-0.05%, fluticasone furoate-vilanterol, hydrocodone-acetaminophen, omeprazole, ondansetron, and promethazine-dextromethorphan.  She is allergic to penicillins and sulfa (sulfonamide antibiotics)..    Review of Systems   Constitutional: Negative for fever, chills, activity change and fatigue.   HENT: Positive for trouble swallowing and congestion. Negative for postnasal drip, sinus pressure and hearing loss.    Respiratory: Positive for cough, wheezing and use of rescue inhaler. Negative for hemoptysis and sputum production.    Cardiovascular: Negative for chest pain, palpitations and leg swelling.   Genitourinary: Negative for difficulty urinating and hematuria.   Endocrine: Negative for cold intolerance and heat intolerance.    Musculoskeletal: Negative for back pain and joint swelling.   Skin: Negative for rash.   Gastrointestinal: Negative for nausea, vomiting, abdominal pain and abdominal distention.   Neurological: Negative for dizziness and light-headedness.   Hematological: Negative for adenopathy.   Psychiatric/Behavioral: Negative for confusion. The patient is not nervous/anxious.       Objective:     /80   Pulse 66   Resp 18   Ht 5' 4" (1.626 m)   Wt 77.3 kg (170 lb 6.7 oz)   SpO2 98%   BMI 29.25 kg/m²   Body mass index is 29.25 kg/m².    Physical Exam   Constitutional: She is oriented to person, place, and time. She appears well-developed and well-nourished.   HENT:   Head: Normocephalic and atraumatic.   Eyes: Pupils are equal, round, and reactive to light. EOM are normal.   Neck: Normal range of motion. Neck " supple.   Cardiovascular: Normal rate and regular rhythm.   Pulmonary/Chest: Effort normal and breath sounds normal.   Abdominal: Soft. Bowel sounds are normal.   Neurological: She is alert and oriented to person, place, and time.   Skin: Skin is warm and dry.   Psychiatric: She has a normal mood and affect. Her behavior is normal.   Nursing note and vitals reviewed.      Personal Diagnostic Review        PFT: 05/14/19: Pulmonary function tests: FEV1: 1.23  (62.5 % predicted), FVC:  2.15 (85.8 % predicted), FEV1/FVC:  51.10, TLC: 3.27 (65.8 % predicted), RV/TLVC: 34.11 (81.7 % predicted), DLCO: 3.02 (41.3 % predicted)  Moderate mixed obstruction with restriction. Diffusion capacity is moderately reduced .        Lab Results   Component Value Date     03/01/2019    K 3.9 03/01/2019     03/01/2019    CO2 30 (H) 03/01/2019    BUN 17 03/01/2019    CREATININE 1.0 03/01/2019    CALCIUM 9.6 03/01/2019     Lab Results   Component Value Date    CKMB 0.5 06/20/2011    TROPONINI 0.007 04/17/2016    TROPONINI <0.04 06/20/2011     Lab Results   Component Value Date    WBC 10.04 03/01/2019    HGB 12.3 03/01/2019    HCT 39.3 03/01/2019    MCV 83 03/01/2019     03/01/2019     Lab Results   Component Value Date    CHOL 162 06/20/2011    TRIG 42 06/20/2011    HDL 63 06/20/2011         CXR: 11/12/18:  Lungs are clear without focal consolidation.  No pleural effusion.  Hilar structures are unremarkable.  Cardiomediastinal silhouette and osseous structures are unchanged      Assessment / plan :       Discussed diagnosis, its evaluation, treatment and usual course. All questions answered.    Problem List Items Addressed This Visit        Pulmonary    Mixed restrictive and obstructive lung disease - Primary    Current Assessment & Plan     MIXED COPD ROS: taking medications as instructed, no medication side effects noted, no significant ongoing wheezing or shortness of breath, using bronchodilator MDI less than twice a  week.   New concerns: None.   Exam: appears well, vitals normal, no respiratory distress, acyanotic, normal RR, chest clear to auscultation, no wheezes, rales or rhonchi, symmetric air entry.   Assessment:  MIXED COPD stable.   Plan: BREO,PROVENTIL, DUONEB. Current treatment plan is effective, no change in therapy. MEDS  Refilled. CXR in 6 months.           Relevant Medications    albuterol-ipratropium (DUO-NEB) 2.5 mg-0.5 mg/3 mL nebulizer solution    fluticasone furoate-vilanterol (BREO ELLIPTA) 100-25 mcg/dose diskus inhaler    albuterol (PROVENTIL HFA) 90 mcg/actuation inhaler    Other Relevant Orders    NEBULIZER FOR HOME USE    X-Ray Chest PA And Lateral       Immunology/Multi System    Sarcoidosis    Current Assessment & Plan        CONTINUE  IMURAN. LFT, CBC. ORDERS IN EMR.  Compliance encouraged.           Relevant Medications    azaTHIOprine (IMURAN) 50 mg Tab    Other Relevant Orders    HEPATIC FUNCTION PANEL    Comprehensive metabolic panel               TIME SPENT WITH PATIENT: Time spent: 30 minutes in face to face  discussion concerning diagnosis, prognosis, review of lab and test results, benefits of treatment as well as management of disease, counseling of patient and coordination of care between various health  care providers . Greater than half the time spent was used for coordination of care and counseling of patient.     Follow up in about 6 months (around 11/14/2019) for Mixed obstructive and restrictive lung disease, Sarcoidosis.

## 2019-05-14 NOTE — PATIENT INSTRUCTIONS
Lung Anatomy  Your lungs take air in to give your body oxygen, which the body needs to work. Your lungs, like all the tissues in your body, are made up of billions of tiny specialized cells. Old lung cells die and are replaced by new, identical lung cells. This natural process helps ensure healthy lungs.    Date Last Reviewed: 11/1/2016  © 2860-0568 [employer]. [address] [place] [address], [place] [address]. All rights reserved. This information is not intended as a substitute for professional medical care. Always follow your healthcare professional's instructions.

## 2019-05-23 ENCOUNTER — TELEPHONE (OUTPATIENT)
Dept: PULMONOLOGY | Facility: CLINIC | Age: 69
End: 2019-05-23

## 2019-05-23 NOTE — TELEPHONE ENCOUNTER
----- Message from Mildred Cleveland sent at 5/23/2019  3:29 PM CDT -----  Contact: Pt  Pt is calling in regards to Rx. Pt stated that she has the flu and would like to be on a different Rx.     Pls call pt back at [phone]

## 2019-05-23 NOTE — TELEPHONE ENCOUNTER
Patient states that she was diagnosed with the Flu and given Tamiflu, patient complains of congestion and cough. Instructed to use medication as prescribed.

## 2019-07-06 ENCOUNTER — HOSPITAL ENCOUNTER (EMERGENCY)
Facility: HOSPITAL | Age: 69
Discharge: HOME | End: 2019-07-06
Attending: EMERGENCY MEDICINE
Payer: MEDICARE

## 2019-07-06 VITALS
OXYGEN SATURATION: 99 % | RESPIRATION RATE: 18 BRPM | HEIGHT: 64 IN | BODY MASS INDEX: 28.06 KG/M2 | WEIGHT: 164.38 LBS | HEART RATE: 78 BPM | DIASTOLIC BLOOD PRESSURE: 81 MMHG | SYSTOLIC BLOOD PRESSURE: 195 MMHG | TEMPERATURE: 98 F

## 2019-07-06 DIAGNOSIS — R52 PAIN: Primary | ICD-10-CM

## 2019-07-06 DIAGNOSIS — S46.912A STRAIN OF LEFT SHOULDER, INITIAL ENCOUNTER: ICD-10-CM

## 2019-07-06 PROCEDURE — 63600175 PHARM REV CODE 636 W HCPCS: Performed by: EMERGENCY MEDICINE

## 2019-07-06 PROCEDURE — 96372 THER/PROPH/DIAG INJ SC/IM: CPT

## 2019-07-06 PROCEDURE — 25000003 PHARM REV CODE 250: Performed by: EMERGENCY MEDICINE

## 2019-07-06 PROCEDURE — 99283 EMERGENCY DEPT VISIT LOW MDM: CPT | Mod: 25

## 2019-07-06 RX ORDER — IBUPROFEN 400 MG/1
400 TABLET ORAL
Status: COMPLETED | OUTPATIENT
Start: 2019-07-06 | End: 2019-07-06

## 2019-07-06 RX ORDER — HYDROMORPHONE HYDROCHLORIDE 2 MG/ML
1 INJECTION, SOLUTION INTRAMUSCULAR; INTRAVENOUS; SUBCUTANEOUS
Status: COMPLETED | OUTPATIENT
Start: 2019-07-06 | End: 2019-07-06

## 2019-07-06 RX ADMIN — IBUPROFEN 400 MG: 400 TABLET, FILM COATED ORAL at 11:07

## 2019-07-06 RX ADMIN — HYDROMORPHONE HYDROCHLORIDE 1 MG: 2 INJECTION, SOLUTION INTRAMUSCULAR; INTRAVENOUS; SUBCUTANEOUS at 11:07

## 2019-07-06 NOTE — ED PROVIDER NOTES
"SCRIBE #1 NOTE: I, Kaleb Jose E, am scribing for, and in the presence of, Tremayne Carmona Jr., MD. I have scribed the entire note.       History     Chief Complaint   Patient presents with    Shoulder Pain     patient states she lifted a 30 pound pot of potatoes on the 4th of July and has had pain to left shoulder region     Review of patient's allergies indicates:   Allergen Reactions    Penicillins Rash    Sulfa (sulfonamide antibiotics) Rash         History of Present Illness     HPI    7/6/2019, 10:55 AM  History obtained from the patient      History of Present Illness: Coral Gamboa is a 69 y.o. female patient with a PMHx of HTN, GERD, sarcoidosis, DM,  who presents to the Emergency Department for evaluation of "shooting" L shoulder pain which onset suddenly 2 days ago. Symptoms are constant and moderate in severity. Pt reports sxs exacerbated with movement. No associated sxs reported. Patient denies any neck pain, back pain, CP, palpitations, bilateral wrist pain, bilateral elbow pain, and all other sxs at this time. Prior Tx includes 325-mg NORCO and a muscle relaxer w/o relief. Pt reports that she was lifting a pot of potatoes while cooking immediately prior to onset of sxs and that she is currently in pain management. No further complaints or concerns at this time.         Arrival mode: Personal vehicle    PCP: Ravindra Rincon MD        Past Medical History:  Past Medical History:   Diagnosis Date    Diabetes mellitus     GERD (gastroesophageal reflux disease)     Hypertension     Sarcoidosis     Sarcoidosis        Past Surgical History:  Past Surgical History:   Procedure Laterality Date    EYE SURGERY      TUBAL LIGATION      TUBAL LIGATION      Reversal         Family History:  History reviewed. No pertinent family history.    Social History:  Social History     Tobacco Use    Smoking status: Former Smoker    Smokeless tobacco: Never Used   Substance and Sexual Activity    Alcohol " "use: No    Drug use: No    Sexual activity: unknown        Review of Systems     Review of Systems   Constitutional: Negative for fever.   HENT: Negative for sore throat.    Respiratory: Negative for shortness of breath.    Cardiovascular: Negative for chest pain and palpitations.   Gastrointestinal: Negative for nausea.   Genitourinary: Negative for dysuria.   Musculoskeletal: Positive for arthralgias (L shoulder, "shooting"). Negative for back pain and neck pain.        (-) bilateral wrist pain  (-) bilateral elbow pain   Skin: Negative for rash.   Neurological: Negative for weakness.   Hematological: Does not bruise/bleed easily.   All other systems reviewed and are negative.       Physical Exam     Initial Vitals [07/06/19 1042]   BP Pulse Resp Temp SpO2   (!) 195/81 78 18 98.1 °F (36.7 °C) 99 %      MAP       --          Physical Exam  Nursing Notes and Vital Signs Reviewed.  Constitutional: Patient is in no acute distress. Well-developed and well-nourished.  Head: Atraumatic. Normocephalic.  Eyes: PERRL. EOM intact. Conjunctivae are not pale. No scleral icterus.  ENT: Mucous membranes are moist. Oropharynx is clear and symmetric.    Neck: Supple. Full ROM. No lymphadenopathy.  Cardiovascular: Regular rate. Regular rhythm. No murmurs, rubs, or gallops. Distal pulses are 2+ and symmetric.  Pulmonary/Chest: No respiratory distress. Clear to auscultation bilaterally. No wheezing or rales. Tenderness and spasms to anterior chest wall.   Abdominal: Soft and non-distended.  There is no tenderness.  No rebound, guarding, or rigidity. Good bowel sounds.  Genitourinary: No CVA tenderness  Musculoskeletal: Moves all extremities. No obvious deformities. No edema. No calf tenderness.  Skin: Warm and dry.  Neurological:  Alert, awake, and appropriate.  Normal speech.  No acute focal neurological deficits are appreciated.  Psychiatric: Normal affect. Good eye contact. Appropriate in content.  LUE:  There is no gross bony " "deformity left shoulder.  There is no tenderness to the clavicle or the acromion.  There is tenderness along the superior aspect of the pectoralis muscle.  Spasm.  Pain is worse with engagement of pectoralis muscle.  There is pain with internal rotation of the arm in flexion as well. She is neurovascularly intact in the arm with a normal median radial ulnar musculocutaneous and axillary nerve.  Pulses are intact 2+.           ED Course   Procedures  ED Vital Signs:  Vitals:    07/06/19 1042   BP: (!) 195/81   Pulse: 78   Resp: 18   Temp: 98.1 °F (36.7 °C)   TempSrc: Oral   SpO2: 99%   Weight: 74.6 kg (164 lb 5.7 oz)   Height: 5' 4" (1.626 m)       Abnormal Lab Results:  Labs Reviewed - No data to display     All Lab Results:  None    Imaging Results:  Imaging Results          X-Ray Shoulder Trauma Left (Final result)  Result time 07/06/19 11:07:47    Final result by Cristian Whitaker Jr., MD (07/06/19 11:07:47)                 Impression:      1.  No acute fracture or dislocation left shoulder.      Electronically signed by: Cristian Whitaker Jr., MD  Date:    07/06/2019  Time:    11:07             Narrative:    EXAMINATION:  XR SHOULDER TRAUMA 3 VIEW LEFT    CLINICAL HISTORY:  Pain, unspecified    COMPARISON:  None    FINDINGS:  The bones, joint spaces and soft tissues are within normal limits.  No acute fracture or dislocation.                                        The Emergency Provider reviewed the vital signs and test results, which are outlined above.     ED Discussion     11:47 AM: Reassessed pt at this time.  Pt states her condition has improved at this time. Discussed with pt all pertinent ED information and results. Discussed pt dx and plan of tx. Gave pt all f/u and return to the ED instructions. All questions and concerns were addressed at this time. Pt expresses understanding of information and instructions, and is comfortable with plan to discharge. Pt is stable for discharge.    I discussed with patient " and/or family/caretaker that evaluation in the ED does not suggest any emergent or life threatening medical conditions requiring immediate intervention beyond what was provided in the ED, and I believe patient is safe for discharge.  Regardless, an unremarkable evaluation in the ED does not preclude the development or presence of a serious of life threatening condition. As such, patient was instructed to return immediately for any worsening or change in current symptoms.    I discussed with patient and/or family/caretaker that negative X-ray does not rule out occult fracture or other soft tissue injury.  Persistent pain greater than 7-10 days or increased pain requires follow up, specifically with orthopedics.     Driving or other activities under influence of medications - Patient and/or family/caretaker was given a prescription for, or instructed to use a medicine that may impair ability to drive, operate machinery, or participate in other potentially dangerous activities.  Patient was instructed not to participate in these activities while under the influence of these medications.    11:49 AM  Patient is stable nontoxic.  X-rays are negative. Patient has appears musculoskeletal pain of her left shoulder girdle.  I discussed with the possibility of muscle strain versus rotator cuff injury. I have advised her follow up with primary care doctor for re-evaluation on Monday.  She has a continue home medications as provided by pain management.  The patient verbalized understanding agreement seems reliable.    ED Medication(s):  Medications   ibuprofen tablet 400 mg (400 mg Oral Given 7/6/19 1138)   hydromorphone (PF) injection 1 mg (1 mg Intramuscular Given 7/6/19 1139)       New Prescriptions    No medications on file       Follow-up Information     Ravindra Rincno MD In 1 day.    Specialty:  Family Medicine  Contact information:  [id number] [facility] [id number] [phone]             Ravindra Esquivel  MD Sergey In 2 days.    Specialty:  Family Medicine  Contact information:  [address] [facility]  [place] [address]  [phone]                         Medical Decision Making:   Clinical Tests:   Radiological Study: Ordered and Reviewed             Scribe Attestation:   Scribe #1: I performed the above scribed service and the documentation accurately describes the services I performed. I attest to the accuracy of the note.     Attending:   Physician Attestation Statement for Scribe #1: I, Tremayne Carmona Jr., MD, personally performed the services described in this documentation, as scribed by Kaleb Dorantes, in my presence, and it is both accurate and complete.           Clinical Impression       ICD-10-CM ICD-9-CM   1. Pain R52 780.96   2. Strain of left shoulder, initial encounter S46.912A 840.9       Disposition:   Disposition: Discharged  Condition: Stable         Tremayne Carmona Jr., MD  07/06/19 1149

## 2019-07-06 NOTE — DISCHARGE INSTRUCTIONS
Continue all of her home pain medications.  Follow up with her doctor in 1-2 days for re-evaluation.  Return as needed for any worsening symptoms, problems, questions or concerns.

## 2019-12-24 ENCOUNTER — HOSPITAL ENCOUNTER (EMERGENCY)
Facility: HOSPITAL | Age: 69
Discharge: HOME | End: 2019-12-24
Attending: EMERGENCY MEDICINE
Payer: MEDICARE

## 2019-12-24 VITALS
WEIGHT: 170.63 LBS | BODY MASS INDEX: 29.13 KG/M2 | HEIGHT: 64 IN | OXYGEN SATURATION: 97 % | SYSTOLIC BLOOD PRESSURE: 189 MMHG | DIASTOLIC BLOOD PRESSURE: 83 MMHG | RESPIRATION RATE: 20 BRPM | TEMPERATURE: 99 F | HEART RATE: 82 BPM

## 2019-12-24 DIAGNOSIS — T14.8XXA MUSCLE STRAIN: Primary | ICD-10-CM

## 2019-12-24 DIAGNOSIS — R07.89 CHEST WALL PAIN: ICD-10-CM

## 2019-12-24 DIAGNOSIS — S29.011A MUSCLE STRAIN OF CHEST WALL, INITIAL ENCOUNTER: ICD-10-CM

## 2019-12-24 PROCEDURE — 93010 ELECTROCARDIOGRAM REPORT: CPT | Mod: ,,, | Performed by: INTERNAL MEDICINE

## 2019-12-24 PROCEDURE — 63600175 PHARM REV CODE 636 W HCPCS: Performed by: NURSE PRACTITIONER

## 2019-12-24 PROCEDURE — 99284 EMERGENCY DEPT VISIT MOD MDM: CPT | Mod: 25

## 2019-12-24 PROCEDURE — 96372 THER/PROPH/DIAG INJ SC/IM: CPT

## 2019-12-24 PROCEDURE — 93010 EKG 12-LEAD: ICD-10-PCS | Mod: ,,, | Performed by: INTERNAL MEDICINE

## 2019-12-24 PROCEDURE — 93005 ELECTROCARDIOGRAM TRACING: CPT

## 2019-12-24 PROCEDURE — 25000003 PHARM REV CODE 250: Performed by: NURSE PRACTITIONER

## 2019-12-24 RX ORDER — MORPHINE SULFATE 4 MG/ML
6 INJECTION, SOLUTION INTRAMUSCULAR; INTRAVENOUS
Status: COMPLETED | OUTPATIENT
Start: 2019-12-24 | End: 2019-12-24

## 2019-12-24 RX ORDER — PREDNISONE 20 MG/1
60 TABLET ORAL
Status: COMPLETED | OUTPATIENT
Start: 2019-12-24 | End: 2019-12-24

## 2019-12-24 RX ORDER — ONDANSETRON 8 MG/1
8 TABLET, ORALLY DISINTEGRATING ORAL
Status: COMPLETED | OUTPATIENT
Start: 2019-12-24 | End: 2019-12-24

## 2019-12-24 RX ADMIN — MORPHINE SULFATE 6 MG: 4 INJECTION INTRAVENOUS at 08:12

## 2019-12-24 RX ADMIN — PREDNISONE 60 MG: 20 TABLET ORAL at 08:12

## 2019-12-24 RX ADMIN — ONDANSETRON 8 MG: 8 TABLET, ORALLY DISINTEGRATING ORAL at 08:12

## 2019-12-25 NOTE — ED PROVIDER NOTES
SCRIBE #1 NOTE: I, Demetria Kim, am scribing for, and in the presence of, Theodore Gomez NP. I have scribed the entire note.       History     Chief Complaint   Patient presents with    Muscle Pain     Pt reports muscle pain starting yesterday after picking up a baby bed; pt states she had trauma to area 4 months ago.      Review of patient's allergies indicates:   Allergen Reactions    Penicillins Rash    Sulfa (sulfonamide antibiotics) Rash         History of Present Illness     HPI    12/24/2019, 8:01 PM  History obtained from the patient      History of Present Illness: Coral Gamboa is a 69 y.o. female patient with a PMHx of DM, HTN, and GERD who presents to the Emergency Department for evaluation of L upper chest wall pain which onset gradually one day ago. Pt reports picking up a baby crib 2 days ago which brought on her L upper chest wall pain 1-2 hours after. Pt states that this is similar to the exact pain she had 5 months ago in the same area after picking up a heavy pot of gumbo. Pt states that she went to the ER for a shot of pain medication in her last incident. Symptoms are constant and moderate in severity. No mitigating or exacerbating factors reported. No associated sxs reported. Patient denies any fever, chills, abd pain, n/v/d, dizziness, HA, numbness, weakness, back pain, neck pain, cough, SOB, and all other sxs at this time. Prior Tx includes Hydrocodone last night and muscle relaxer's today, with no relief. No further complaints or concerns at this time.         Arrival mode: Personal vehicle     PCP: Ravindra Rincon MD        Past Medical History:  Past Medical History:   Diagnosis Date    Diabetes mellitus     GERD (gastroesophageal reflux disease)     Hypertension     Sarcoidosis     Sarcoidosis        Past Surgical History:  Past Surgical History:   Procedure Laterality Date    EYE SURGERY      TUBAL LIGATION      TUBAL LIGATION      Reversal         Family  History:  History reviewed. No pertinent family history.    Social History:  Social History     Tobacco Use    Smoking status: Former Smoker    Smokeless tobacco: Never Used   Substance and Sexual Activity    Alcohol use: No    Drug use: No    Sexual activity: Not on file        Review of Systems     Review of Systems   Constitutional: Negative for chills and fever.   HENT: Negative for sore throat.    Respiratory: Negative for cough and shortness of breath.    Cardiovascular: Positive for chest pain (L upper).   Gastrointestinal: Negative for abdominal pain, diarrhea, nausea and vomiting.   Genitourinary: Negative for dysuria.   Musculoskeletal: Negative for back pain and neck pain.   Skin: Negative for rash.   Neurological: Negative for dizziness, weakness, numbness and headaches.   Hematological: Does not bruise/bleed easily.   All other systems reviewed and are negative.     Physical Exam     Initial Vitals [12/24/19 1944]   BP Pulse Resp Temp SpO2   (!) 159/74 75 16 98.7 °F (37.1 °C) 99 %      MAP       --          Physical Exam  Nursing Notes and Vital Signs Reviewed.  Constitutional: Patient is in no acute distress. Well-developed and well-nourished.  Head: Atraumatic. Normocephalic.  Eyes: PERRL. EOM intact. Conjunctivae are not pale. No scleral icterus.  ENT: Mucous membranes are moist. Oropharynx is clear and symmetric.    Neck: Supple. Full ROM. No lymphadenopathy.  Cardiovascular: Regular rate. Regular rhythm. No murmurs, rubs, or gallops. Distal pulses are 2+ and symmetric.  Pulmonary/Chest: No respiratory distress. Clear to auscultation bilaterally. No wheezing or rales. L anterior chest wall tenderness--pain reproduced with movement of left arm and rotation of chest wall  Abdominal: Soft and non-distended.  There is no tenderness.  No rebound, guarding, or rigidity. Good bowel sounds.  Musculoskeletal: Moves all extremities. No obvious deformities. No edema.   Skin: Warm and dry.  Neurological:   "Alert, awake, and appropriate.  Normal speech.  No acute focal neurological deficits are appreciated.  Psychiatric: Normal affect. Good eye contact. Appropriate in content.     ED Course   Procedures  ED Vital Signs:  Vitals:    12/24/19 1944   BP: (!) 159/74   Pulse: 75   Resp: 16   Temp: 98.7 °F (37.1 °C)   TempSrc: Oral   SpO2: 99%   Weight: 77.4 kg (170 lb 10.2 oz)   Height: 5' 4" (1.626 m)       Abnormal Lab Results:  Labs Reviewed - No data to display     All Lab Results:  None.    Imaging Results:  Imaging Results    None          The EKG was ordered, reviewed, and independently interpreted by the ED provider.  Interpretation time: 20:13  Rate: 67 BPM  Rhythm: sinus rhythm with marked sinus arrhythmia   Interpretation: Incomplete right bundle branch block. Nonspecific T wave abnormality. No STEMI.             The Emergency Provider reviewed the vital signs and test results, which are outlined above.     ED Discussion     8:35 PM  Pt resting comfortably, H/P consistent with chest wall/shoulder strain, pain clearly reproducible with palpation and left shoulder and chest movement.  Pt has had exact same pain before with shoulder/chest strain after lifting heavy object and pain today like pain in past.         Medical Decision Making:   Clinical Tests:   Medical Tests: Ordered and Reviewed           ED Medication(s):  Medications   morphine injection 6 mg (6 mg Intramuscular Given 12/24/19 2026)   ondansetron disintegrating tablet 8 mg (8 mg Oral Given 12/24/19 2024)   predniSONE tablet 60 mg (60 mg Oral Given 12/24/19 2025)       New Prescriptions    No medications on file       Follow-up Information     Ravindra Rincon MD. Schedule an appointment as soon as possible for a visit in 2 days.    Specialty:  Family Medicine  Contact information:  [address] [facility]  [place] [address]  [phone]                       Scribe Attestation:   Scribe #1: I performed the above scribed service and the " documentation accurately describes the services I performed. I attest to the accuracy of the note.     Attending:   Physician Attestation Statement for Scribe #1: I, Theodore Gomez NP, personally performed the services described in this documentation, as scribed by Demetria Kim, in my presence, and it is both accurate and complete.           Clinical Impression       ICD-10-CM ICD-9-CM   1. Muscle strain T14.8XXA 848.9   2. Chest wall pain R07.89 786.52   3. Muscle strain of chest wall, initial encounter S29.011A 848.8       Disposition:   Disposition: Discharged  Condition: Stable         Theodore Gomez NP  12/24/19 2037       Theodore Gomez NP  12/24/19 2037

## 2020-01-06 DIAGNOSIS — D86.9 SARCOIDOSIS: Chronic | ICD-10-CM

## 2020-01-07 RX ORDER — AZATHIOPRINE 50 MG/1
TABLET ORAL
Qty: 90 TABLET | Refills: 3 | Status: SHIPPED | OUTPATIENT
Start: 2020-01-07 | End: 2020-12-03

## 2020-03-09 ENCOUNTER — HOSPITAL ENCOUNTER (OUTPATIENT)
Dept: RADIOLOGY | Facility: HOSPITAL | Age: 70
Discharge: HOME | End: 2020-03-09
Attending: INTERNAL MEDICINE
Payer: MEDICARE

## 2020-03-09 ENCOUNTER — OFFICE VISIT (OUTPATIENT)
Dept: PULMONOLOGY | Facility: CLINIC | Age: 70
End: 2020-03-09
Payer: MEDICARE

## 2020-03-09 VITALS
WEIGHT: 167.44 LBS | SYSTOLIC BLOOD PRESSURE: 144 MMHG | DIASTOLIC BLOOD PRESSURE: 80 MMHG | HEART RATE: 76 BPM | OXYGEN SATURATION: 98 % | HEIGHT: 64 IN | BODY MASS INDEX: 28.59 KG/M2

## 2020-03-09 DIAGNOSIS — J43.9 MIXED RESTRICTIVE AND OBSTRUCTIVE LUNG DISEASE: Chronic | ICD-10-CM

## 2020-03-09 DIAGNOSIS — D86.9 SARCOIDOSIS: Chronic | ICD-10-CM

## 2020-03-09 DIAGNOSIS — J43.9 MIXED RESTRICTIVE AND OBSTRUCTIVE LUNG DISEASE: ICD-10-CM

## 2020-03-09 DIAGNOSIS — J98.4 MIXED RESTRICTIVE AND OBSTRUCTIVE LUNG DISEASE: Chronic | ICD-10-CM

## 2020-03-09 DIAGNOSIS — J98.4 MIXED RESTRICTIVE AND OBSTRUCTIVE LUNG DISEASE: ICD-10-CM

## 2020-03-09 DIAGNOSIS — J98.4 MIXED RESTRICTIVE AND OBSTRUCTIVE LUNG DISEASE: Primary | Chronic | ICD-10-CM

## 2020-03-09 DIAGNOSIS — J43.9 MIXED RESTRICTIVE AND OBSTRUCTIVE LUNG DISEASE: Primary | Chronic | ICD-10-CM

## 2020-03-09 PROCEDURE — 71046 X-RAY EXAM CHEST 2 VIEWS: CPT | Mod: 26,,, | Performed by: RADIOLOGY

## 2020-03-09 PROCEDURE — 99999 PR PBB SHADOW E&M-EST. PATIENT-LVL III: CPT | Mod: PBBFAC,,, | Performed by: INTERNAL MEDICINE

## 2020-03-09 PROCEDURE — 99999 PR PBB SHADOW E&M-EST. PATIENT-LVL III: ICD-10-PCS | Mod: PBBFAC,,, | Performed by: INTERNAL MEDICINE

## 2020-03-09 PROCEDURE — 71046 XR CHEST PA AND LATERAL: ICD-10-PCS | Mod: 26,,, | Performed by: RADIOLOGY

## 2020-03-09 PROCEDURE — 71046 X-RAY EXAM CHEST 2 VIEWS: CPT | Mod: TC

## 2020-03-09 PROCEDURE — 99214 PR OFFICE/OUTPT VISIT, EST, LEVL IV, 30-39 MIN: ICD-10-PCS | Mod: S$PBB,,, | Performed by: INTERNAL MEDICINE

## 2020-03-09 PROCEDURE — 99214 OFFICE O/P EST MOD 30 MIN: CPT | Mod: S$PBB,,, | Performed by: INTERNAL MEDICINE

## 2020-03-09 NOTE — PROGRESS NOTES
Subjective:      Patient ID: Coral Gamboa is a 69 y.o. female.  Patient Active Problem List   Diagnosis    Mixed restrictive and obstructive lung disease    Sarcoidosis     Problem list has been reviewed.    Chief Complaint: Sarcoidosis and Mixed Restrictive and Obstructive Lung Disease    HPI    Group Spirometric Classification Exacerbations / year mMRC CAT   Group B: Low risk; more symptoms  GOLD 1- 2  < = 1 >= 2 >=10        FEV1: 1.23  (62.5 % predicted)    She reports stable  fatigue and stable exertional dyspnea.    She no longer take IMURAN.     Her current respiratory therapy regimen: BREO ELLIPTA, PROVENTIL and DUONEB which provides relief.       COPD Questionnaire  How often do you cough?: (P) A little of the time  How often do you have phlegm (mucus) in your chest?: (P) A little of the time  How often does your chest feel tight?: (P) A little of the time  When you walk up a hill or one flight of stairs, how often are you breathless?: (P) Most of the time  How often are you limited doing any activities at home?: (P) Most of the time  How often are you confident leaving the house despite your lung condition?: (P) All of the time  How often do you sleep soundly?: (P) Most of the time  How often do you have energy?: (P) A little of the time  Total score: (P) 18       Immunization status reviewed and updated.    A full  review of systems, past , family  and social histories was performed except as mentioned in the note above, these are non contributory to the main issues discussed today.      Previous Report Reviewed: office notes     The following portions of the patient's history were reviewed and updated as appropriate: She  has a past medical history of Diabetes mellitus, GERD (gastroesophageal reflux disease), Hypertension, Sarcoidosis, and Sarcoidosis.  She  has a past surgical history that includes Tubal ligation; Tubal ligation; and Eye surgery.  Her family history is not on file.  She  reports  "that she has quit smoking. She has never used smokeless tobacco. She reports that she does not drink alcohol or use drugs.  She has a current medication list which includes the following prescription(s): albuterol, albuterol-ipratropium, amitriptyline, amlodipine-olmesartan, aspirin, azathioprine, clotrimazole-betamethasone 1-0.05%, fluticasone furoate-vilanterol, hydrocodone-acetaminophen, omeprazole, ondansetron, and promethazine-dextromethorphan.  She is allergic to penicillins and sulfa (sulfonamide antibiotics)..    Review of Systems   Constitutional: Negative for fever, chills, activity change and fatigue.   HENT: Positive for trouble swallowing and congestion. Negative for postnasal drip, sinus pressure and hearing loss.    Respiratory: Positive for cough, wheezing and use of rescue inhaler. Negative for hemoptysis and sputum production.    Cardiovascular: Negative for chest pain, palpitations and leg swelling.   Genitourinary: Negative for difficulty urinating and hematuria.   Endocrine: Negative for cold intolerance and heat intolerance.    Musculoskeletal: Negative for back pain and joint swelling.   Skin: Negative for rash.   Gastrointestinal: Negative for nausea, vomiting, abdominal pain and abdominal distention.   Neurological: Negative for dizziness and light-headedness.   Hematological: Negative for adenopathy.   Psychiatric/Behavioral: Negative for confusion. The patient is not nervous/anxious.       Objective:     BP (!) 144/80   Pulse 76   Ht 5' 4" (1.626 m)   Wt 76 kg (167 lb 7 oz)   SpO2 98%   BMI 28.74 kg/m²   Body mass index is 28.74 kg/m².    Physical Exam   Constitutional: She is oriented to person, place, and time. She appears well-developed and well-nourished.   HENT:   Head: Normocephalic and atraumatic.   Eyes: Pupils are equal, round, and reactive to light. EOM are normal.   Neck: Normal range of motion. Neck supple.   Cardiovascular: Normal rate and regular rhythm. "   Pulmonary/Chest: Effort normal and breath sounds normal.   Abdominal: Soft. Bowel sounds are normal.   Neurological: She is alert and oriented to person, place, and time.   Skin: Skin is warm and dry.   Psychiatric: She has a normal mood and affect. Her behavior is normal.   Nursing note and vitals reviewed.      Personal Diagnostic Review      CXR: 03/09/20: Cardiac silhouette and mediastinal contours are normal.  Lungs are clear.  Osseous structures are intact.    PFT: 05/14/19: Pulmonary function tests: FEV1: 1.23  (62.5 % predicted), FVC:  2.15 (85.8 % predicted), FEV1/FVC:  51.10, TLC: 3.27 (65.8 % predicted), RV/TLVC: 34.11 (81.7 % predicted), DLCO: 3.02 (41.3 % predicted)  Moderate mixed obstruction with restriction. Diffusion capacity is moderately reduced .            Assessment / plan :       Discussed diagnosis, its evaluation, treatment and usual course. All questions answered.    Problem List Items Addressed This Visit        Pulmonary    Mixed restrictive and obstructive lung disease - Primary    Current Assessment & Plan     MIXED COPD ROS: taking medications as instructed, no medication side effects noted  New concerns: None.   Exam: appears well, vitals normal, no respiratory distress, acyanotic, normal RR, chest clear to auscultation, no wheezes, rales or rhonchi, symmetric air entry.   Assessment:  MIXED COPD stable.   Plan: BREO,PROVENTIL, DUONEB. Current treatment plan is effective, no change in therapy. PFT, 6MWT  in 6 months.           Relevant Orders    Complete PFT without bronchodilator       Immunology/Multi System    Sarcoidosis    Current Assessment & Plan         ASSESSMENT:    Sarcoidosis stage:   [x]        Stage I Sarcoidosis  []        Stage II Sarcoidosis  []        Stage III Sarcoidosis  []        Stage IV Srcoidosis    Clinical assessment:   []        Symptomatic  [x]        Assymptomatic       Indications for treatment of pulmonary sarcoidosis:   []        Evidence of  progressive disease   []        Severe disease at presentation    Management options:   [x]        Observation without therapy  []        Systemic glucocorticoids  []        Methotrexate.  []        Azathioprine   []        Leuflonamide  []        Mycophenolate  []        Anti TNF alpha antagonists       Repeat CXR in 1 year.              Relevant Orders    Pulmonary stress test               TIME SPENT WITH PATIENT: Time spent: 30 minutes in face to face  discussion concerning diagnosis, prognosis, review of lab and test results, benefits of treatment as well as management of disease, counseling of patient and coordination of care between various health  care providers . Greater than half the time spent was used for coordination of care and counseling of patient.     Follow up in about 6 months (around 9/9/2020) for Mixed obstructive and restrictive lung disease, Sarcoidosis.

## 2020-03-09 NOTE — ASSESSMENT & PLAN NOTE
ASSESSMENT:    Sarcoidosis stage:   [x]        Stage I Sarcoidosis  []        Stage II Sarcoidosis  []        Stage III Sarcoidosis  []        Stage IV Srcoidosis    Clinical assessment:   []        Symptomatic  [x]        Assymptomatic       Indications for treatment of pulmonary sarcoidosis:   []        Evidence of progressive disease   []        Severe disease at presentation    Management options:   [x]        Observation without therapy  []        Systemic glucocorticoids  []        Methotrexate.  []        Azathioprine   []        Leuflonamide  []        Mycophenolate  []        Anti TNF alpha antagonists       Repeat CXR in 1 year.

## 2020-03-09 NOTE — PATIENT INSTRUCTIONS
Lung Anatomy  Your lungs take air in to give your body oxygen, which the body needs to work. Your lungs, like all the tissues in your body, are made up of billions of tiny specialized cells. Old lung cells die and are replaced by new, identical lung cells. This natural process helps ensure healthy lungs.    Date Last Reviewed: 11/1/2016  © 2491-0275 [employer]. [address] [place] [address], [place] [address]. All rights reserved. This information is not intended as a substitute for professional medical care. Always follow your healthcare professional's instructions.

## 2020-03-09 NOTE — ASSESSMENT & PLAN NOTE
MIXED COPD ROS: taking medications as instructed, no medication side effects noted  New concerns: None.   Exam: appears well, vitals normal, no respiratory distress, acyanotic, normal RR, chest clear to auscultation, no wheezes, rales or rhonchi, symmetric air entry.   Assessment:  MIXED COPD stable.   Plan: BREO,PROVENTIL, DUONEB. Current treatment plan is effective, no change in therapy. PFT, 6MWT  in 6 months.

## 2020-08-26 DIAGNOSIS — J98.4 MIXED RESTRICTIVE AND OBSTRUCTIVE LUNG DISEASE: Primary | ICD-10-CM

## 2020-08-26 DIAGNOSIS — J43.9 MIXED RESTRICTIVE AND OBSTRUCTIVE LUNG DISEASE: Primary | ICD-10-CM

## 2020-09-01 ENCOUNTER — TELEPHONE (OUTPATIENT)
Dept: PULMONOLOGY | Facility: CLINIC | Age: 70
End: 2020-09-01

## 2020-09-10 ENCOUNTER — OFFICE VISIT (OUTPATIENT)
Dept: PULMONOLOGY | Facility: CLINIC | Age: 70
End: 2020-09-10
Payer: MEDICARE

## 2020-09-10 ENCOUNTER — CLINICAL SUPPORT (OUTPATIENT)
Dept: PULMONOLOGY | Facility: CLINIC | Age: 70
End: 2020-09-10
Payer: MEDICARE

## 2020-09-10 VITALS
WEIGHT: 168 LBS | RESPIRATION RATE: 20 BRPM | DIASTOLIC BLOOD PRESSURE: 61 MMHG | WEIGHT: 169 LBS | BODY MASS INDEX: 28.85 KG/M2 | HEIGHT: 64 IN | OXYGEN SATURATION: 98 % | HEIGHT: 64 IN | SYSTOLIC BLOOD PRESSURE: 132 MMHG | BODY MASS INDEX: 28.68 KG/M2

## 2020-09-10 DIAGNOSIS — J43.9 MIXED RESTRICTIVE AND OBSTRUCTIVE LUNG DISEASE: Primary | Chronic | ICD-10-CM

## 2020-09-10 DIAGNOSIS — D86.9 SARCOIDOSIS: Chronic | ICD-10-CM

## 2020-09-10 DIAGNOSIS — R00.1 BRADYCARDIA: ICD-10-CM

## 2020-09-10 DIAGNOSIS — J98.4 MIXED RESTRICTIVE AND OBSTRUCTIVE LUNG DISEASE: Primary | Chronic | ICD-10-CM

## 2020-09-10 PROCEDURE — 3008F BODY MASS INDEX DOCD: CPT | Mod: CPTII,S$GLB,, | Performed by: INTERNAL MEDICINE

## 2020-09-10 PROCEDURE — 99213 PR OFFICE/OUTPT VISIT, EST, LEVL III, 20-29 MIN: ICD-10-PCS | Mod: S$GLB,,, | Performed by: INTERNAL MEDICINE

## 2020-09-10 PROCEDURE — 1159F MED LIST DOCD IN RCRD: CPT | Mod: S$GLB,,, | Performed by: INTERNAL MEDICINE

## 2020-09-10 PROCEDURE — 3075F SYST BP GE 130 - 139MM HG: CPT | Mod: CPTII,S$GLB,, | Performed by: INTERNAL MEDICINE

## 2020-09-10 PROCEDURE — 99213 OFFICE O/P EST LOW 20 MIN: CPT | Mod: S$GLB,,, | Performed by: INTERNAL MEDICINE

## 2020-09-10 PROCEDURE — 3078F DIAST BP <80 MM HG: CPT | Mod: CPTII,S$GLB,, | Performed by: INTERNAL MEDICINE

## 2020-09-10 PROCEDURE — 1101F PR PT FALLS ASSESS DOC 0-1 FALLS W/OUT INJ PAST YR: ICD-10-PCS | Mod: CPTII,S$GLB,, | Performed by: INTERNAL MEDICINE

## 2020-09-10 PROCEDURE — 3075F PR MOST RECENT SYSTOLIC BLOOD PRESS GE 130-139MM HG: ICD-10-PCS | Mod: CPTII,S$GLB,, | Performed by: INTERNAL MEDICINE

## 2020-09-10 PROCEDURE — 3008F PR BODY MASS INDEX (BMI) DOCUMENTED: ICD-10-PCS | Mod: CPTII,S$GLB,, | Performed by: INTERNAL MEDICINE

## 2020-09-10 PROCEDURE — 99999 PR PBB SHADOW E&M-EST. PATIENT-LVL IV: CPT | Mod: PBBFAC,,, | Performed by: INTERNAL MEDICINE

## 2020-09-10 PROCEDURE — 94618 PULMONARY STRESS TESTING: ICD-10-PCS | Mod: S$GLB,,, | Performed by: INTERNAL MEDICINE

## 2020-09-10 PROCEDURE — 99999 PR PBB SHADOW E&M-EST. PATIENT-LVL IV: ICD-10-PCS | Mod: PBBFAC,,, | Performed by: INTERNAL MEDICINE

## 2020-09-10 PROCEDURE — 1101F PT FALLS ASSESS-DOCD LE1/YR: CPT | Mod: CPTII,S$GLB,, | Performed by: INTERNAL MEDICINE

## 2020-09-10 PROCEDURE — 3078F PR MOST RECENT DIASTOLIC BLOOD PRESSURE < 80 MM HG: ICD-10-PCS | Mod: CPTII,S$GLB,, | Performed by: INTERNAL MEDICINE

## 2020-09-10 PROCEDURE — 1159F PR MEDICATION LIST DOCUMENTED IN MEDICAL RECORD: ICD-10-PCS | Mod: S$GLB,,, | Performed by: INTERNAL MEDICINE

## 2020-09-10 PROCEDURE — 94618 PULMONARY STRESS TESTING: CPT | Mod: S$GLB,,, | Performed by: INTERNAL MEDICINE

## 2020-09-10 NOTE — ASSESSMENT & PLAN NOTE
ASSESSMENT:    Sarcoidosis stage:   [x]        Stage I Sarcoidosis  []        Stage II Sarcoidosis  []        Stage III Sarcoidosis  []        Stage IV Srcoidosis    Clinical assessment:   []        Symptomatic  [x]        Assymptomatic       Indications for treatment of pulmonary sarcoidosis:   []        Evidence of progressive disease   []        Severe disease at presentation    Management options:   [x]        Observation without therapy  []        Systemic glucocorticoids  []        Methotrexate.  []        Azathioprine   []        Leuflonamide  []        Mycophenolate  []        Anti TNF alpha antagonists       CT chest in 1 year  ACE levels in 1 year.

## 2020-09-10 NOTE — ASSESSMENT & PLAN NOTE
MIXED COPD ROS: taking medications as instructed, no medication side effects noted  New concerns: None.   Exam: appears well, vitals normal, no respiratory distress, acyanotic, normal RR, chest clear to auscultation, no wheezes, rales or rhonchi, symmetric air entry.   Assessment:  MIXED COPD stable.   Plan: BREO,PROVENTIL, DUONEB. Current treatment plan is effective, no change in therapy. PFT, 6MWT  in 12 months.

## 2020-09-10 NOTE — PROCEDURES
"[facility] - [facility]  Six Minute Walk     SUMMARY     Ordering Provider: Dr. Goodrich   Interpreting Provider: Dr. Goodrich  Performing nurse/tech/RT: RALF Morataya RRT  Diagnosis: Sarcoidosis  Height: 5' 4" (162.6 cm)  Weight: 76.6 kg (168 lb 15.7 oz)  BMI (Calculated): 29   Patient Race:             Phase Oxygen Assessment Supplemental O2 Heart   Rate Blood Pressure Saad Dyspnea Scale Rating   Resting 98 % Room Air 60 bpm 147/71 4   Exercise        Minute        1 100 % Room Air 82 bpm     2 98 % Room Air 78 bpm     3 98 % Room Air 79 bpm     4 98 % Room Air 80 bpm     5 98 % Room Air 77 bpm     6  99 % Room Air 82 bpm 148/64 5-6   Recovery        Minute        1 98 % Room Air 74 bpm     2 99 % Room Air 65 bpm     3 100 % Room Air 66 bpm     4 100 % Room Air 62 bpm 132/61 3     Six Minute Walk Summary  6MWT Status: completed without stopping  Patient Reported: Dyspnea     Interpretation:  Did the patient stop or pause?: No                                         Total Time Walked (Calculated): 360 seconds  Final Partial Lap Distance (feet): 100 feet  Total Distance Meters (Calculated): 274.32 meters  Predicted Distance Meters (Calculated): 435.74 meters  Percentage of Predicted (Calculated): 62.95  Peak VO2 (Calculated): 12.21  Mets: 3.49  Has The Patient Had a Previous Six Minute Walk Test?: Yes       Previous 6MWT Results  Has The Patient Had a Previous Six Minute Walk Test?: Yes  Date of Previous Test: 10/13/17  Total Time Walked: 360 seconds  Total Distance (meters): 312.42  Predicted Distance (meters): 447.87 meters  Percentage of Predicted: 69.76  Percent Change (Calculated): 0.12        PHYSICIAN INTERPRETATION AND COMMENTS:       Six minute walk distance is 274.32 / 435.74 meters (62.95 % predicted) with somewhat heavy dyspnea.      Peak VO2 during walking was 12.21  Ml/min/kg [ 3.49 METS].      Baseline oxygen saturation (at rest) was 98 %.  Lowest oxygen saturation during walking was 98 %. "      During exercise, there was no significant desaturation while breathing room air.     A desaturation event was defined as a decrease of saturation by 4 or more.     Blood pressure remained stable and heart rate was irregular with walking.  Bradycardia was present prior to exercise.  This may represent an abnormal cardiovascular response to exercise.  The patient did not report non-pulmonary symptoms during exercise.   There was  Significant exercise impairment during walking.  Significant exercise impairment is likely due to cardiovascular causes.  The patient did complete the study, walking 360 seconds of the 360 second test.  The patient did not require oxygen supplementation during walking.  The patient may not benefit from using supplemental oxygen.  Since the previous study in 10/13/17, exercise capacity may be somewhat worse.  Based upon age and body mass index, exercise capacity is less than predicted.

## 2020-09-10 NOTE — PATIENT INSTRUCTIONS
Lung Anatomy  Your lungs take air in to give your body oxygen, which the body needs to work. Your lungs, like all the tissues in your body, are made up of billions of tiny specialized cells. Old lung cells die and are replaced by new, identical lung cells. This natural process helps ensure healthy lungs.    Date Last Reviewed: 11/1/2016  © 2375-1220 [facility]. [address] [place] [address], [place] [address]. All rights reserved. This information is not intended as a substitute for professional medical care. Always follow your healthcare professional's instructions.

## 2020-09-10 NOTE — PROGRESS NOTES
Subjective:      Patient ID: Coral Gamboa is a 70 y.o. female.  Patient Active Problem List   Diagnosis    Mixed restrictive and obstructive lung disease    Sarcoidosis    Bradycardia     Problem list has been reviewed.    Chief Complaint: Sarcoidosis and Mixed restrictive and obstructive lung disease    HPI    Group Spirometric Classification Exacerbations / year mMRC CAT   Group B: Low risk; more symptoms  GOLD 1- 2  < = 1 >= 2 >=10        FEV1: 1.23  (62.5 % predicted)    6MWT reviewd with patient who voiced understanding.   She reports stable  fatigue and stable exertional dyspnea.        Her current respiratory therapy regimen: BREO ELLIPTA, PROVENTIL and DUONEB which provides relief.       COPD Questionnaire  How often do you cough?: A little of the time  How often do you have phlegm (mucus) in your chest?: Never  How often does your chest feel tight?: A little of the time  When you walk up a hill or one flight of stairs, how often are you breathless?: All of the time  How often are you limited doing any activities at home?: All of the time  How often are you confident leaving the house despite your lung condition?: Some of the time  How often do you sleep soundly?: Some of the time  How often do you have energy?: Some of the time  Total score: 20       Immunization status reviewed and updated.    A full  review of systems, past , family  and social histories was performed except as mentioned in the note above, these are non contributory to the main issues discussed today.      Previous Report Reviewed: office notes     The following portions of the patient's history were reviewed and updated as appropriate: She  has a past medical history of Diabetes mellitus, GERD (gastroesophageal reflux disease), Hypertension, Sarcoidosis, and Sarcoidosis.  She  has a past surgical history that includes Tubal ligation; Tubal ligation; and Eye surgery.  Her family history is not on file.  She  reports that she has  "quit smoking. She has never used smokeless tobacco. She reports that she does not drink alcohol or use drugs.  She has a current medication list which includes the following prescription(s): albuterol, amitriptyline, amlodipine-olmesartan, aspirin, azathioprine, clotrimazole-betamethasone 1-0.05%, fluticasone furoate-vilanterol, hydrocodone-acetaminophen, omeprazole, ondansetron, promethazine-dextromethorphan, and albuterol-ipratropium.  She is allergic to penicillins and sulfa (sulfonamide antibiotics)..    Review of Systems   Constitutional: Negative for fever, chills, activity change and fatigue.   HENT: Positive for trouble swallowing and congestion. Negative for postnasal drip, sinus pressure and hearing loss.    Respiratory: Positive for cough, wheezing and use of rescue inhaler. Negative for hemoptysis and sputum production.    Cardiovascular: Negative for chest pain, palpitations and leg swelling.   Genitourinary: Negative for difficulty urinating and hematuria.   Endocrine: Negative for cold intolerance and heat intolerance.    Musculoskeletal: Negative for back pain and joint swelling.   Skin: Negative for rash.   Gastrointestinal: Negative for nausea, vomiting, abdominal pain and abdominal distention.   Neurological: Negative for dizziness and light-headedness.   Hematological: Negative for adenopathy.   Psychiatric/Behavioral: Negative for confusion. The patient is not nervous/anxious.       Objective:     /61   Pulse (!) 0   Resp 20   Ht 5' 4" (1.626 m)   Wt 76.2 kg (168 lb)   SpO2 98%   BMI 28.84 kg/m²   Body mass index is 28.84 kg/m².    Physical Exam  Vitals signs and nursing note reviewed.   Constitutional:       Appearance: She is well-developed.   HENT:      Head: Normocephalic and atraumatic.   Eyes:      Pupils: Pupils are equal, round, and reactive to light.   Neck:      Musculoskeletal: Normal range of motion and neck supple.   Cardiovascular:      Rate and Rhythm: Normal rate and " regular rhythm.   Pulmonary:      Effort: Pulmonary effort is normal.      Breath sounds: Normal breath sounds.   Abdominal:      General: Bowel sounds are normal.      Palpations: Abdomen is soft.   Skin:     General: Skin is warm and dry.   Neurological:      Mental Status: She is alert and oriented to person, place, and time.   Psychiatric:         Behavior: Behavior normal.         Personal Diagnostic Review      Six minute walk test: 09/10/20:     Six minute walk distance is 274.32 / 435.74 meters (62.95 % predicted) with somewhat heavy dyspnea.     Peak VO2 during walking was 12.21  Ml/min/kg [ 3.49 METS].     Baseline oxygen saturation (at rest) was 98 %.  Lowest oxygen saturation during walking was 98 %.     During exercise, there was no significant desaturation while breathing room air.    A desaturation event was defined as a decrease of saturation by 4 or more.    Blood pressure remained stable and heart rate was irregular with walking.  Bradycardia was present prior to exercise.  This may represent an abnormal cardiovascular response to exercise.  The patient did not report non-pulmonary symptoms during exercise.   There was  Significant exercise impairment during walking.  Significant exercise impairment is likely due to cardiovascular causes.  The patient did complete the study, walking 360 seconds of the 360 second test.  The patient did not require oxygen supplementation during walking.  The patient may not benefit from using supplemental oxygen.  Since the previous study in 10/13/17, exercise capacity may be somewhat worse.  Based upon age and body mass index, exercise capacity is less than predicted.        CXR: 03/09/20: Cardiac silhouette and mediastinal contours are normal.  Lungs are clear.  Osseous structures are intact.    PFT: 05/14/19: Pulmonary function tests: FEV1: 1.23  (62.5 % predicted), FVC:  2.15 (85.8 % predicted), FEV1/FVC:  51.10, TLC: 3.27 (65.8 % predicted), RV/TLVC: 34.11 (81.7  % predicted), DLCO: 3.02 (41.3 % predicted)  Moderate mixed obstruction with restriction. Diffusion capacity is moderately reduced .            Assessment / plan :       Discussed diagnosis, its evaluation, treatment and usual course. All questions answered.    Problem List Items Addressed This Visit        Pulmonary    Mixed restrictive and obstructive lung disease - Primary    Current Assessment & Plan     MIXED COPD ROS: taking medications as instructed, no medication side effects noted  New concerns: None.   Exam: appears well, vitals normal, no respiratory distress, acyanotic, normal RR, chest clear to auscultation, no wheezes, rales or rhonchi, symmetric air entry.   Assessment:  MIXED COPD stable.   Plan: BREO,PROVENTIL, DUONEB. Current treatment plan is effective, no change in therapy. PFT, 6MWT  in 12 months.           Relevant Orders    Complete PFT without bronchodilator    Pulmonary stress test    CT Chest Without Contrast       Cardiac/Vascular    Bradycardia    Current Assessment & Plan     Check EKG    2D ECHO => telephone review.          Relevant Orders    EKG 12-lead    Echo Color Flow Doppler? Yes; Bubble Contrast? No       Immunology/Multi System    Sarcoidosis    Current Assessment & Plan         ASSESSMENT:    Sarcoidosis stage:   [x]        Stage I Sarcoidosis  []        Stage II Sarcoidosis  []        Stage III Sarcoidosis  []        Stage IV Srcoidosis    Clinical assessment:   []        Symptomatic  [x]        Assymptomatic       Indications for treatment of pulmonary sarcoidosis:   []        Evidence of progressive disease   []        Severe disease at presentation    Management options:   [x]        Observation without therapy  []        Systemic glucocorticoids  []        Methotrexate.  []        Azathioprine   []        Leuflonamide  []        Mycophenolate  []        Anti TNF alpha antagonists       CT chest in 1 year  ACE levels in 1 year.               Relevant Orders    Angiotensin  Converting Enzyme               TIME SPENT WITH PATIENT: Time spent: 30 minutes in face to face  discussion concerning diagnosis, prognosis, review of lab and test results, benefits of treatment as well as management of disease, counseling of patient and coordination of care between various health  care providers . Greater than half the time spent was used for coordination of care and counseling of patient.     Follow up in about 1 year (around 9/10/2021) for Sarcoidosis, Mixed obstructive and restrictive lung disease.

## 2020-09-24 ENCOUNTER — HOSPITAL ENCOUNTER (OUTPATIENT)
Dept: CARDIOLOGY | Facility: HOSPITAL | Age: 70
Discharge: HOME | End: 2020-09-24
Attending: INTERNAL MEDICINE
Payer: MEDICARE

## 2020-09-24 VITALS
DIASTOLIC BLOOD PRESSURE: 60 MMHG | WEIGHT: 168 LBS | HEART RATE: 60 BPM | HEIGHT: 64 IN | BODY MASS INDEX: 28.68 KG/M2 | SYSTOLIC BLOOD PRESSURE: 130 MMHG

## 2020-09-24 DIAGNOSIS — R00.1 BRADYCARDIA: ICD-10-CM

## 2020-09-24 PROCEDURE — 93010 ELECTROCARDIOGRAM REPORT: CPT | Mod: ,,, | Performed by: INTERNAL MEDICINE

## 2020-09-24 PROCEDURE — 93306 TTE W/DOPPLER COMPLETE: CPT

## 2020-09-24 PROCEDURE — 93010 EKG 12-LEAD: ICD-10-PCS | Mod: ,,, | Performed by: INTERNAL MEDICINE

## 2020-09-24 PROCEDURE — 93005 ELECTROCARDIOGRAM TRACING: CPT

## 2020-09-24 PROCEDURE — 93306 ECHO (CUPID ONLY): ICD-10-PCS | Mod: 26,,, | Performed by: INTERNAL MEDICINE

## 2020-09-24 PROCEDURE — 93306 TTE W/DOPPLER COMPLETE: CPT | Mod: 26,,, | Performed by: INTERNAL MEDICINE

## 2020-09-25 LAB
AORTIC ROOT ANNULUS: 2.34 CM
ASCENDING AORTA: 2.39 CM
AV INDEX (PROSTH): 0.47
AV MEAN GRADIENT: 9 MMHG
AV PEAK GRADIENT: 17 MMHG
AV VALVE AREA: 1.31 CM2
AV VELOCITY RATIO: 0.47
BSA FOR ECHO PROCEDURE: 1.85 M2
CV ECHO LV RWT: 0.45 CM
DOP CALC AO PEAK VEL: 2.05 M/S
DOP CALC AO VTI: 51.92 CM
DOP CALC LVOT AREA: 2.8 CM2
DOP CALC LVOT DIAMETER: 1.89 CM
DOP CALC LVOT PEAK VEL: 0.97 M/S
DOP CALC LVOT STROKE VOLUME: 68.06 CM3
DOP CALC RVOT PEAK VEL: 0.83 M/S
DOP CALC RVOT VTI: 19.97 CM
DOP CALCLVOT PEAK VEL VTI: 24.27 CM
E WAVE DECELERATION TIME: 175.84 MSEC
E/A RATIO: 0.87
E/E' RATIO: 10.78 M/S
ECHO LV POSTERIOR WALL: 0.95 CM (ref 0.6–1.1)
FRACTIONAL SHORTENING: 33 % (ref 28–44)
INTERVENTRICULAR SEPTUM: 0.91 CM (ref 0.6–1.1)
IVRT: 82.78 MSEC
LA MAJOR: 4.1 CM
LA MINOR: 4.52 CM
LA WIDTH: 2.84 CM
LEFT ATRIUM SIZE: 3.07 CM
LEFT ATRIUM VOLUME INDEX: 17.5 ML/M2
LEFT ATRIUM VOLUME: 31.87 CM3
LEFT INTERNAL DIMENSION IN SYSTOLE: 2.82 CM (ref 2.1–4)
LEFT VENTRICLE DIASTOLIC VOLUME INDEX: 43.27 ML/M2
LEFT VENTRICLE DIASTOLIC VOLUME: 78.6 ML
LEFT VENTRICLE MASS INDEX: 68 G/M2
LEFT VENTRICLE SYSTOLIC VOLUME INDEX: 16.5 ML/M2
LEFT VENTRICLE SYSTOLIC VOLUME: 29.96 ML
LEFT VENTRICULAR INTERNAL DIMENSION IN DIASTOLE: 4.2 CM (ref 3.5–6)
LEFT VENTRICULAR MASS: 124.12 G
LV LATERAL E/E' RATIO: 10.78 M/S
LV SEPTAL E/E' RATIO: 10.78 M/S
MV PEAK A VEL: 1.11 M/S
MV PEAK E VEL: 0.97 M/S
MV STENOSIS PRESSURE HALF TIME: 50.99 MS
MV VALVE AREA P 1/2 METHOD: 4.31 CM2
PISA TR MAX VEL: 2.7 M/S
PULM VEIN S/D RATIO: 1.09
PV MEAN GRADIENT: 1.59 MMHG
PV PEAK D VEL: 0.68 M/S
PV PEAK S VEL: 0.74 M/S
PV PEAK VELOCITY: 1.32 CM/S
RA MAJOR: 4.1 CM
RA WIDTH: 2.92 CM
RIGHT VENTRICULAR END-DIASTOLIC DIMENSION: 3.04 CM
SINUS: 2.24 CM
STJ: 1.94 CM
TDI LATERAL: 0.09 M/S
TDI SEPTAL: 0.09 M/S
TDI: 0.09 M/S
TR MAX PG: 29 MMHG

## 2020-09-25 NOTE — PROGRESS NOTES
2D echo with color flow doppler and bubble contrast :    Results: within normal limits.    Please do not hesitate to call our office if you have any questions. Pleas do well to keep you appointments as scheduled.     RALF Goodrich MD  [facility]

## 2020-12-21 ENCOUNTER — CLINICAL SUPPORT (OUTPATIENT)
Dept: URGENT CARE | Facility: CLINIC | Age: 70
End: 2020-12-21
Payer: MEDICARE

## 2020-12-21 DIAGNOSIS — Z11.9 ENCOUNTER FOR SCREENING EXAMINATION FOR INFECTIOUS DISEASE: Primary | ICD-10-CM

## 2020-12-21 LAB
CTP QC/QA: YES
SARS-COV-2 RDRP RESP QL NAA+PROBE: NEGATIVE

## 2020-12-21 PROCEDURE — U0002 COVID-19 LAB TEST NON-CDC: HCPCS | Mod: QW,CR,S$GLB, | Performed by: PHYSICIAN ASSISTANT

## 2020-12-21 PROCEDURE — 99211 PR OFFICE/OUTPT VISIT, EST, LEVL I: ICD-10-PCS | Mod: S$GLB,CS,, | Performed by: PHYSICIAN ASSISTANT

## 2020-12-21 PROCEDURE — U0002: ICD-10-PCS | Mod: QW,CR,S$GLB, | Performed by: PHYSICIAN ASSISTANT

## 2020-12-21 PROCEDURE — 99211 OFF/OP EST MAY X REQ PHY/QHP: CPT | Mod: S$GLB,CS,, | Performed by: PHYSICIAN ASSISTANT

## 2021-01-25 LAB
CHOLEST SERPL-MSCNC: 169 MG/DL (ref 0–200)
HBA1C MFR BLD: 7.3 % (ref 4–6)
HDLC SERPL-MCNC: 64 MG/DL
LDL/HDL RATIO: 1.4
LDLC SERPL CALC-MCNC: 91 MG/DL (ref 0–160)
TRIGL SERPL-MCNC: 73 MG/DL
VLDLC SERPL-MCNC: 14 MG/DL

## 2021-04-20 ENCOUNTER — OFFICE VISIT (OUTPATIENT)
Dept: ORTHOPEDICS | Facility: CLINIC | Age: 71
End: 2021-04-20
Payer: MEDICARE

## 2021-04-20 VITALS — HEIGHT: 64 IN | WEIGHT: 168 LBS | BODY MASS INDEX: 28.68 KG/M2

## 2021-04-20 DIAGNOSIS — S46.011A TRAUMATIC INCOMPLETE TEAR OF RIGHT ROTATOR CUFF, INITIAL ENCOUNTER: Primary | ICD-10-CM

## 2021-04-20 DIAGNOSIS — M25.511 RIGHT SHOULDER PAIN, UNSPECIFIED CHRONICITY: Primary | ICD-10-CM

## 2021-04-20 PROCEDURE — 99203 OFFICE O/P NEW LOW 30 MIN: CPT | Mod: 25,S$GLB,, | Performed by: STUDENT IN AN ORGANIZED HEALTH CARE EDUCATION/TRAINING PROGRAM

## 2021-04-20 PROCEDURE — 1159F MED LIST DOCD IN RCRD: CPT | Mod: S$GLB,,, | Performed by: STUDENT IN AN ORGANIZED HEALTH CARE EDUCATION/TRAINING PROGRAM

## 2021-04-20 PROCEDURE — 20610 LARGE JOINT ASPIRATION/INJECTION: R SUBACROMIAL BURSA: ICD-10-PCS | Mod: RT,S$GLB,, | Performed by: STUDENT IN AN ORGANIZED HEALTH CARE EDUCATION/TRAINING PROGRAM

## 2021-04-20 PROCEDURE — 3288F PR FALLS RISK ASSESSMENT DOCUMENTED: ICD-10-PCS | Mod: CPTII,S$GLB,, | Performed by: STUDENT IN AN ORGANIZED HEALTH CARE EDUCATION/TRAINING PROGRAM

## 2021-04-20 PROCEDURE — 3008F BODY MASS INDEX DOCD: CPT | Mod: CPTII,S$GLB,, | Performed by: STUDENT IN AN ORGANIZED HEALTH CARE EDUCATION/TRAINING PROGRAM

## 2021-04-20 PROCEDURE — 99203 PR OFFICE/OUTPT VISIT, NEW, LEVL III, 30-44 MIN: ICD-10-PCS | Mod: 25,S$GLB,, | Performed by: STUDENT IN AN ORGANIZED HEALTH CARE EDUCATION/TRAINING PROGRAM

## 2021-04-20 PROCEDURE — 1125F AMNT PAIN NOTED PAIN PRSNT: CPT | Mod: S$GLB,,, | Performed by: STUDENT IN AN ORGANIZED HEALTH CARE EDUCATION/TRAINING PROGRAM

## 2021-04-20 PROCEDURE — 99999 PR PBB SHADOW E&M-EST. PATIENT-LVL V: ICD-10-PCS | Mod: PBBFAC,,, | Performed by: STUDENT IN AN ORGANIZED HEALTH CARE EDUCATION/TRAINING PROGRAM

## 2021-04-20 PROCEDURE — 1159F PR MEDICATION LIST DOCUMENTED IN MEDICAL RECORD: ICD-10-PCS | Mod: S$GLB,,, | Performed by: STUDENT IN AN ORGANIZED HEALTH CARE EDUCATION/TRAINING PROGRAM

## 2021-04-20 PROCEDURE — 1125F PR PAIN SEVERITY QUANTIFIED, PAIN PRESENT: ICD-10-PCS | Mod: S$GLB,,, | Performed by: STUDENT IN AN ORGANIZED HEALTH CARE EDUCATION/TRAINING PROGRAM

## 2021-04-20 PROCEDURE — 3008F PR BODY MASS INDEX (BMI) DOCUMENTED: ICD-10-PCS | Mod: CPTII,S$GLB,, | Performed by: STUDENT IN AN ORGANIZED HEALTH CARE EDUCATION/TRAINING PROGRAM

## 2021-04-20 PROCEDURE — 1101F PR PT FALLS ASSESS DOC 0-1 FALLS W/OUT INJ PAST YR: ICD-10-PCS | Mod: CPTII,S$GLB,, | Performed by: STUDENT IN AN ORGANIZED HEALTH CARE EDUCATION/TRAINING PROGRAM

## 2021-04-20 PROCEDURE — 3288F FALL RISK ASSESSMENT DOCD: CPT | Mod: CPTII,S$GLB,, | Performed by: STUDENT IN AN ORGANIZED HEALTH CARE EDUCATION/TRAINING PROGRAM

## 2021-04-20 PROCEDURE — 1101F PT FALLS ASSESS-DOCD LE1/YR: CPT | Mod: CPTII,S$GLB,, | Performed by: STUDENT IN AN ORGANIZED HEALTH CARE EDUCATION/TRAINING PROGRAM

## 2021-04-20 PROCEDURE — 20610 DRAIN/INJ JOINT/BURSA W/O US: CPT | Mod: RT,S$GLB,, | Performed by: STUDENT IN AN ORGANIZED HEALTH CARE EDUCATION/TRAINING PROGRAM

## 2021-04-20 PROCEDURE — 99999 PR PBB SHADOW E&M-EST. PATIENT-LVL V: CPT | Mod: PBBFAC,,, | Performed by: STUDENT IN AN ORGANIZED HEALTH CARE EDUCATION/TRAINING PROGRAM

## 2021-04-20 RX ORDER — TOLTERODINE 4 MG/1
CAPSULE, EXTENDED RELEASE ORAL
COMMUNITY
Start: 2021-03-16 | End: 2021-06-17

## 2021-04-20 RX ORDER — METHOCARBAMOL 500 MG/1
500 TABLET, FILM COATED ORAL 3 TIMES DAILY PRN
COMMUNITY
Start: 2021-03-02

## 2021-04-20 RX ORDER — CYCLOBENZAPRINE HCL 5 MG
5 TABLET ORAL 3 TIMES DAILY PRN
COMMUNITY
Start: 2021-04-16

## 2021-04-20 RX ORDER — FLUOCINONIDE TOPICAL SOLUTION USP, 0.05% 0.5 MG/ML
SOLUTION TOPICAL
COMMUNITY
Start: 2020-09-22 | End: 2021-06-17

## 2021-04-20 RX ORDER — MOMETASONE FUROATE 1 MG/G
CREAM TOPICAL
COMMUNITY
Start: 2021-01-25 | End: 2021-06-17

## 2021-04-20 RX ORDER — CARVEDILOL 6.25 MG/1
6.25 TABLET ORAL 2 TIMES DAILY
COMMUNITY
Start: 2021-04-04

## 2021-04-20 RX ORDER — GLUCOSAM/CHON-MSM1/C/MANG/BOSW 500-416.6
1 TABLET ORAL 3 TIMES DAILY
COMMUNITY
Start: 2021-03-02

## 2021-04-20 RX ORDER — KETOCONAZOLE 20 MG/ML
SHAMPOO, SUSPENSION TOPICAL
COMMUNITY
Start: 2020-09-22 | End: 2021-06-17

## 2021-04-20 RX ORDER — TACROLIMUS 1 MG/G
OINTMENT TOPICAL
COMMUNITY
Start: 2021-02-24 | End: 2021-06-17

## 2021-04-20 RX ORDER — DICLOFENAC SODIUM 10 MG/G
2 GEL TOPICAL DAILY PRN
COMMUNITY
Start: 2021-04-06

## 2021-04-20 RX ORDER — PREDNISONE 10 MG/1
TABLET ORAL
COMMUNITY
Start: 2021-04-06 | End: 2021-07-09

## 2021-04-20 RX ORDER — IBUPROFEN 800 MG/1
800 TABLET ORAL EVERY 6 HOURS PRN
COMMUNITY
Start: 2021-03-31

## 2021-04-20 RX ORDER — HYDROXYZINE HYDROCHLORIDE 25 MG/1
TABLET, FILM COATED ORAL
COMMUNITY
Start: 2021-02-23 | End: 2021-06-17

## 2021-04-20 RX ORDER — MONTELUKAST SODIUM 10 MG/1
TABLET ORAL
COMMUNITY
Start: 2021-03-16 | End: 2021-06-17 | Stop reason: SDUPTHER

## 2021-04-20 RX ADMIN — TRIAMCINOLONE ACETONIDE 80 MG: 40 INJECTION, SUSPENSION INTRA-ARTICULAR; INTRAMUSCULAR at 01:04

## 2021-04-21 RX ORDER — TRIAMCINOLONE ACETONIDE 40 MG/ML
80 INJECTION, SUSPENSION INTRA-ARTICULAR; INTRAMUSCULAR
Status: DISCONTINUED | OUTPATIENT
Start: 2021-04-20 | End: 2021-04-21 | Stop reason: HOSPADM

## 2021-04-27 ENCOUNTER — TELEPHONE (OUTPATIENT)
Dept: ORTHOPEDICS | Facility: CLINIC | Age: 71
End: 2021-04-27

## 2021-05-10 LAB
CHOL/HDLC RATIO: 2.5
CHOLEST SERPL-MSCNC: 170 MG/DL (ref 0–200)
HBA1C MFR BLD: 8.4 % (ref 4–6)
HDLC SERPL-MCNC: 67 MG/DL
LDLC SERPL CALC-MCNC: 92 MG/DL (ref 0–160)
T4, FREE (DIRECT): 1.18
TRIGL SERPL-MCNC: 56 MG/DL
TRIIODOTHYRONINE (T3), FREE: 2.5
TSH SERPL DL<=0.005 MIU/L-ACNC: 1.33 UIU/ML (ref 0.41–5.9)
VLDLC SERPL-MCNC: 11 MG/DL

## 2021-05-26 ENCOUNTER — TELEPHONE (OUTPATIENT)
Dept: PULMONOLOGY | Facility: CLINIC | Age: 71
End: 2021-05-26

## 2021-06-09 ENCOUNTER — TELEPHONE (OUTPATIENT)
Dept: PULMONOLOGY | Facility: CLINIC | Age: 71
End: 2021-06-09

## 2021-06-09 DIAGNOSIS — J98.4 MIXED RESTRICTIVE AND OBSTRUCTIVE LUNG DISEASE: Primary | ICD-10-CM

## 2021-06-09 DIAGNOSIS — J43.9 MIXED RESTRICTIVE AND OBSTRUCTIVE LUNG DISEASE: Primary | ICD-10-CM

## 2021-06-11 ENCOUNTER — HOSPITAL ENCOUNTER (EMERGENCY)
Facility: HOSPITAL | Age: 71
Discharge: HOME | End: 2021-06-11
Attending: EMERGENCY MEDICINE
Payer: MEDICARE

## 2021-06-11 VITALS
OXYGEN SATURATION: 98 % | RESPIRATION RATE: 18 BRPM | BODY MASS INDEX: 27.48 KG/M2 | WEIGHT: 160.94 LBS | DIASTOLIC BLOOD PRESSURE: 70 MMHG | HEIGHT: 64 IN | HEART RATE: 68 BPM | TEMPERATURE: 99 F | SYSTOLIC BLOOD PRESSURE: 152 MMHG

## 2021-06-11 DIAGNOSIS — L03.011 PARONYCHIA OF FINGER, RIGHT: Primary | ICD-10-CM

## 2021-06-11 PROCEDURE — 25000003 PHARM REV CODE 250: Performed by: EMERGENCY MEDICINE

## 2021-06-11 PROCEDURE — 10060 I&D ABSCESS SIMPLE/SINGLE: CPT

## 2021-06-11 PROCEDURE — 99283 EMERGENCY DEPT VISIT LOW MDM: CPT | Mod: 25

## 2021-06-11 RX ORDER — LIDOCAINE HYDROCHLORIDE 10 MG/ML
10 INJECTION, SOLUTION EPIDURAL; INFILTRATION; INTRACAUDAL; PERINEURAL
Status: COMPLETED | OUTPATIENT
Start: 2021-06-11 | End: 2021-06-11

## 2021-06-11 RX ORDER — TRAMADOL HYDROCHLORIDE 50 MG/1
50 TABLET ORAL
Status: COMPLETED | OUTPATIENT
Start: 2021-06-11 | End: 2021-06-11

## 2021-06-11 RX ORDER — DOXYCYCLINE 100 MG/1
100 CAPSULE ORAL EVERY 12 HOURS
Qty: 20 CAPSULE | Refills: 0 | Status: SHIPPED | OUTPATIENT
Start: 2021-06-11 | End: 2021-06-17

## 2021-06-11 RX ORDER — ONDANSETRON 4 MG/1
4 TABLET, ORALLY DISINTEGRATING ORAL
Status: COMPLETED | OUTPATIENT
Start: 2021-06-11 | End: 2021-06-11

## 2021-06-11 RX ADMIN — TRAMADOL HYDROCHLORIDE 50 MG: 50 TABLET, FILM COATED ORAL at 08:06

## 2021-06-11 RX ADMIN — ONDANSETRON 4 MG: 4 TABLET, ORALLY DISINTEGRATING ORAL at 08:06

## 2021-06-11 RX ADMIN — LIDOCAINE HYDROCHLORIDE 100 MG: 10 INJECTION, SOLUTION EPIDURAL; INFILTRATION; INTRACAUDAL; PERINEURAL at 07:06

## 2021-06-16 ENCOUNTER — TELEPHONE (OUTPATIENT)
Dept: SLEEP MEDICINE | Facility: CLINIC | Age: 71
End: 2021-06-16

## 2021-06-17 ENCOUNTER — HOSPITAL ENCOUNTER (OUTPATIENT)
Dept: RADIOLOGY | Facility: HOSPITAL | Age: 71
Discharge: HOME | End: 2021-06-17
Attending: INTERNAL MEDICINE
Payer: MEDICARE

## 2021-06-17 ENCOUNTER — CLINICAL SUPPORT (OUTPATIENT)
Dept: PULMONOLOGY | Facility: CLINIC | Age: 71
End: 2021-06-17
Payer: MEDICARE

## 2021-06-17 ENCOUNTER — OFFICE VISIT (OUTPATIENT)
Dept: PULMONOLOGY | Facility: CLINIC | Age: 71
End: 2021-06-17
Payer: MEDICARE

## 2021-06-17 VITALS
SYSTOLIC BLOOD PRESSURE: 136 MMHG | HEIGHT: 64 IN | DIASTOLIC BLOOD PRESSURE: 70 MMHG | HEART RATE: 56 BPM | BODY MASS INDEX: 27.2 KG/M2 | RESPIRATION RATE: 17 BRPM | OXYGEN SATURATION: 98 % | WEIGHT: 159.31 LBS

## 2021-06-17 DIAGNOSIS — R09.02 EXERCISE HYPOXEMIA: ICD-10-CM

## 2021-06-17 DIAGNOSIS — J98.4 MIXED RESTRICTIVE AND OBSTRUCTIVE LUNG DISEASE: Chronic | ICD-10-CM

## 2021-06-17 DIAGNOSIS — Z01.811 PRE-OPERATIVE RESPIRATORY EXAMINATION: ICD-10-CM

## 2021-06-17 DIAGNOSIS — D86.9 SARCOIDOSIS: Primary | ICD-10-CM

## 2021-06-17 DIAGNOSIS — J98.4 MIXED RESTRICTIVE AND OBSTRUCTIVE LUNG DISEASE: ICD-10-CM

## 2021-06-17 DIAGNOSIS — J43.9 MIXED RESTRICTIVE AND OBSTRUCTIVE LUNG DISEASE: Chronic | ICD-10-CM

## 2021-06-17 DIAGNOSIS — J43.9 MIXED RESTRICTIVE AND OBSTRUCTIVE LUNG DISEASE: ICD-10-CM

## 2021-06-17 LAB
ALLENS TEST: ABNORMAL
BRPFT: ABNORMAL
DELSYS: ABNORMAL
FEF 25 75 CHG: 0 %
FEF 25 75 LLN: 0.61
FEF 25 75 POST REF: 29.4 %
FEF 25 75 PRE REF: 29.4 %
FEF 25 75 REF: 1.61
FET100 CHG: 16.2 %
FEV1 CHG: 4.5 %
FEV1 FVC CHG: 0.3 %
FEV1 FVC LLN: 66
FEV1 FVC POST REF: 76 %
FEV1 FVC PRE REF: 75.8 %
FEV1 FVC REF: 79
FEV1 LLN: 1.28
FEV1 POST REF: 66.2 %
FEV1 PRE REF: 63.4 %
FEV1 REF: 1.84
FIO2: 0.21
FVC CHG: 4.1 %
FVC LLN: 1.66
FVC POST REF: 86.6 %
FVC PRE REF: 83.1 %
FVC REF: 2.35
HCO3 UR-SCNC: 25.5 MMOL/L (ref 24–28)
MODE: ABNORMAL
PCO2 BLDA: 33.5 MMHG (ref 35–45)
PEF CHG: -3.9 %
PEF LLN: 2.74
PEF POST REF: 96.8 %
PEF PRE REF: 100.7 %
PEF REF: 4.68
PH SMN: 7.49 [PH] (ref 7.35–7.45)
PO2 BLDA: 77 MMHG (ref 80–100)
POC BE: 2 MMOL/L
POC SATURATED O2: 96 % (ref 95–100)
POST FEF 25 75: 0.47 L/S (ref 0.61–2.62)
POST FET 100: 10.63 SEC
POST FEV1 FVC: 59.82 % (ref 65.75–91.62)
POST FEV1: 1.22 L (ref 1.28–2.39)
POST FVC: 2.04 L (ref 1.66–3.04)
POST PEF: 4.53 L/S (ref 2.74–6.63)
PRE FEF 25 75: 0.47 L/S (ref 0.61–2.62)
PRE FET 100: 9.15 SEC
PRE FEV1 FVC: 59.64 % (ref 65.75–91.62)
PRE FEV1: 1.17 L (ref 1.28–2.39)
PRE FVC: 1.96 L (ref 1.66–3.04)
PRE PEF: 4.72 L/S (ref 2.74–6.63)
SAMPLE: ABNORMAL
SITE: ABNORMAL

## 2021-06-17 PROCEDURE — 71046 XR CHEST PA AND LATERAL: ICD-10-PCS | Mod: 26,,, | Performed by: RADIOLOGY

## 2021-06-17 PROCEDURE — 1101F PR PT FALLS ASSESS DOC 0-1 FALLS W/OUT INJ PAST YR: ICD-10-PCS | Mod: CPTII,S$GLB,, | Performed by: INTERNAL MEDICINE

## 2021-06-17 PROCEDURE — 3288F FALL RISK ASSESSMENT DOCD: CPT | Mod: CPTII,S$GLB,, | Performed by: INTERNAL MEDICINE

## 2021-06-17 PROCEDURE — 71046 X-RAY EXAM CHEST 2 VIEWS: CPT | Mod: TC

## 2021-06-17 PROCEDURE — 1101F PT FALLS ASSESS-DOCD LE1/YR: CPT | Mod: CPTII,S$GLB,, | Performed by: INTERNAL MEDICINE

## 2021-06-17 PROCEDURE — 1159F PR MEDICATION LIST DOCUMENTED IN MEDICAL RECORD: ICD-10-PCS | Mod: S$GLB,,, | Performed by: INTERNAL MEDICINE

## 2021-06-17 PROCEDURE — 82803 PR  BLOOD GASES: PH, PO2 & PCO2: ICD-10-PCS | Mod: S$GLB,,, | Performed by: INTERNAL MEDICINE

## 2021-06-17 PROCEDURE — 94060 PR EVAL OF BRONCHOSPASM: ICD-10-PCS | Mod: S$GLB,,, | Performed by: INTERNAL MEDICINE

## 2021-06-17 PROCEDURE — 3008F PR BODY MASS INDEX (BMI) DOCUMENTED: ICD-10-PCS | Mod: CPTII,S$GLB,, | Performed by: INTERNAL MEDICINE

## 2021-06-17 PROCEDURE — 82803 BLOOD GASES ANY COMBINATION: CPT | Mod: S$GLB,,, | Performed by: INTERNAL MEDICINE

## 2021-06-17 PROCEDURE — 3288F PR FALLS RISK ASSESSMENT DOCUMENTED: ICD-10-PCS | Mod: CPTII,S$GLB,, | Performed by: INTERNAL MEDICINE

## 2021-06-17 PROCEDURE — 99215 PR OFFICE/OUTPT VISIT, EST, LEVL V, 40-54 MIN: ICD-10-PCS | Mod: 25,S$GLB,, | Performed by: INTERNAL MEDICINE

## 2021-06-17 PROCEDURE — 3008F BODY MASS INDEX DOCD: CPT | Mod: CPTII,S$GLB,, | Performed by: INTERNAL MEDICINE

## 2021-06-17 PROCEDURE — 71046 X-RAY EXAM CHEST 2 VIEWS: CPT | Mod: 26,,, | Performed by: RADIOLOGY

## 2021-06-17 PROCEDURE — 36600 WITHDRAWAL OF ARTERIAL BLOOD: CPT | Mod: S$GLB,,, | Performed by: INTERNAL MEDICINE

## 2021-06-17 PROCEDURE — 36600 PR WITHDRAWAL OF ARTERIAL BLOOD: ICD-10-PCS | Mod: S$GLB,,, | Performed by: INTERNAL MEDICINE

## 2021-06-17 PROCEDURE — 99215 OFFICE O/P EST HI 40 MIN: CPT | Mod: 25,S$GLB,, | Performed by: INTERNAL MEDICINE

## 2021-06-17 PROCEDURE — 94060 EVALUATION OF WHEEZING: CPT | Mod: S$GLB,,, | Performed by: INTERNAL MEDICINE

## 2021-06-17 PROCEDURE — 99999 PR PBB SHADOW E&M-EST. PATIENT-LVL IV: ICD-10-PCS | Mod: PBBFAC,,, | Performed by: INTERNAL MEDICINE

## 2021-06-17 PROCEDURE — 1159F MED LIST DOCD IN RCRD: CPT | Mod: S$GLB,,, | Performed by: INTERNAL MEDICINE

## 2021-06-17 PROCEDURE — 99999 PR PBB SHADOW E&M-EST. PATIENT-LVL IV: CPT | Mod: PBBFAC,,, | Performed by: INTERNAL MEDICINE

## 2021-06-17 RX ORDER — CALCIUM CITRATE/VITAMIN D3 200MG-6.25
1 TABLET ORAL 3 TIMES DAILY
COMMUNITY
Start: 2020-12-28

## 2021-06-17 RX ORDER — ZOLPIDEM TARTRATE 10 MG/1
10 TABLET ORAL NIGHTLY PRN
COMMUNITY
Start: 2021-05-28

## 2021-06-17 RX ORDER — SITAGLIPTIN 50 MG/1
50 TABLET, FILM COATED ORAL DAILY
COMMUNITY
Start: 2021-05-24

## 2021-06-17 RX ORDER — ASPIRIN 325 MG
50000 TABLET, DELAYED RELEASE (ENTERIC COATED) ORAL
COMMUNITY
Start: 2021-05-24

## 2021-06-17 RX ORDER — MONTELUKAST SODIUM 10 MG/1
10 TABLET ORAL NIGHTLY
Qty: 90 TABLET | Refills: 3 | Status: SHIPPED | OUTPATIENT
Start: 2021-06-17

## 2021-06-17 RX ORDER — IPRATROPIUM BROMIDE AND ALBUTEROL SULFATE 2.5; .5 MG/3ML; MG/3ML
3 SOLUTION RESPIRATORY (INHALATION) EVERY 6 HOURS PRN
Qty: 120 VIAL | Refills: 11 | Status: SHIPPED | OUTPATIENT
Start: 2021-06-17 | End: 2022-06-17

## 2021-06-22 ENCOUNTER — HOSPITAL ENCOUNTER (EMERGENCY)
Facility: HOSPITAL | Age: 71
Discharge: HOME | End: 2021-06-22
Attending: EMERGENCY MEDICINE
Payer: MEDICARE

## 2021-06-22 VITALS
OXYGEN SATURATION: 99 % | WEIGHT: 159.06 LBS | BODY MASS INDEX: 27.16 KG/M2 | RESPIRATION RATE: 19 BRPM | TEMPERATURE: 98 F | HEART RATE: 67 BPM | SYSTOLIC BLOOD PRESSURE: 172 MMHG | DIASTOLIC BLOOD PRESSURE: 72 MMHG | HEIGHT: 64 IN

## 2021-06-22 DIAGNOSIS — R10.30 LOWER ABDOMINAL PAIN: Primary | ICD-10-CM

## 2021-06-22 LAB
ALBUMIN SERPL BCP-MCNC: 4 G/DL (ref 3.5–5.2)
ALP SERPL-CCNC: 115 U/L (ref 55–135)
ALT SERPL W/O P-5'-P-CCNC: 16 U/L (ref 10–44)
ANION GAP SERPL CALC-SCNC: 15 MMOL/L (ref 8–16)
AST SERPL-CCNC: 22 U/L (ref 10–40)
BASOPHILS # BLD AUTO: 0.04 K/UL (ref 0–0.2)
BASOPHILS NFR BLD: 0.5 % (ref 0–1.9)
BILIRUB SERPL-MCNC: 0.3 MG/DL (ref 0.1–1)
BILIRUB UR QL STRIP: NEGATIVE
BUN SERPL-MCNC: 19 MG/DL (ref 8–23)
CALCIUM SERPL-MCNC: 9.8 MG/DL (ref 8.7–10.5)
CHLORIDE SERPL-SCNC: 104 MMOL/L (ref 95–110)
CLARITY UR: CLEAR
CO2 SERPL-SCNC: 24 MMOL/L (ref 23–29)
COLOR UR: YELLOW
CREAT SERPL-MCNC: 1 MG/DL (ref 0.5–1.4)
DIFFERENTIAL METHOD: ABNORMAL
EOSINOPHIL # BLD AUTO: 0.1 K/UL (ref 0–0.5)
EOSINOPHIL NFR BLD: 1.2 % (ref 0–8)
ERYTHROCYTE [DISTWIDTH] IN BLOOD BY AUTOMATED COUNT: 15.3 % (ref 11.5–14.5)
EST. GFR  (AFRICAN AMERICAN): >60 ML/MIN/1.73 M^2
EST. GFR  (NON AFRICAN AMERICAN): 57 ML/MIN/1.73 M^2
GLUCOSE SERPL-MCNC: 104 MG/DL (ref 70–110)
GLUCOSE UR QL STRIP: NEGATIVE
HCT VFR BLD AUTO: 41.1 % (ref 37–48.5)
HCV AB SERPL QL IA: NEGATIVE
HEP C VIRUS HOLD SPECIMEN: NORMAL
HGB BLD-MCNC: 12.4 G/DL (ref 12–16)
HGB UR QL STRIP: NEGATIVE
IMM GRANULOCYTES # BLD AUTO: 0.02 K/UL (ref 0–0.04)
IMM GRANULOCYTES NFR BLD AUTO: 0.3 % (ref 0–0.5)
KETONES UR QL STRIP: ABNORMAL
LEUKOCYTE ESTERASE UR QL STRIP: NEGATIVE
LIPASE SERPL-CCNC: 34 U/L (ref 4–60)
LYMPHOCYTES # BLD AUTO: 2.8 K/UL (ref 1–4.8)
LYMPHOCYTES NFR BLD: 37.9 % (ref 18–48)
MCH RBC QN AUTO: 25.7 PG (ref 27–31)
MCHC RBC AUTO-ENTMCNC: 30.2 G/DL (ref 32–36)
MCV RBC AUTO: 85 FL (ref 82–98)
MONOCYTES # BLD AUTO: 0.8 K/UL (ref 0.3–1)
MONOCYTES NFR BLD: 10.9 % (ref 4–15)
NEUTROPHILS # BLD AUTO: 3.6 K/UL (ref 1.8–7.7)
NEUTROPHILS NFR BLD: 49.2 % (ref 38–73)
NITRITE UR QL STRIP: NEGATIVE
NRBC BLD-RTO: 0 /100 WBC
PH UR STRIP: 6 [PH] (ref 5–8)
PLATELET # BLD AUTO: 282 K/UL (ref 150–450)
PMV BLD AUTO: 11.1 FL (ref 9.2–12.9)
POTASSIUM SERPL-SCNC: 4.2 MMOL/L (ref 3.5–5.1)
PROT SERPL-MCNC: 8.1 G/DL (ref 6–8.4)
PROT UR QL STRIP: NEGATIVE
RBC # BLD AUTO: 4.83 M/UL (ref 4–5.4)
SODIUM SERPL-SCNC: 143 MMOL/L (ref 136–145)
SP GR UR STRIP: >=1.03 (ref 1–1.03)
URN SPEC COLLECT METH UR: ABNORMAL
UROBILINOGEN UR STRIP-ACNC: 1 EU/DL
WBC # BLD AUTO: 7.41 K/UL (ref 3.9–12.7)

## 2021-06-22 PROCEDURE — 99285 EMERGENCY DEPT VISIT HI MDM: CPT | Mod: 25

## 2021-06-22 PROCEDURE — 85025 COMPLETE CBC W/AUTO DIFF WBC: CPT | Performed by: PHYSICIAN ASSISTANT

## 2021-06-22 PROCEDURE — 80053 COMPREHEN METABOLIC PANEL: CPT | Performed by: PHYSICIAN ASSISTANT

## 2021-06-22 PROCEDURE — 83690 ASSAY OF LIPASE: CPT | Performed by: PHYSICIAN ASSISTANT

## 2021-06-22 PROCEDURE — 25500020 PHARM REV CODE 255: Performed by: EMERGENCY MEDICINE

## 2021-06-22 PROCEDURE — 86803 HEPATITIS C AB TEST: CPT | Performed by: EMERGENCY MEDICINE

## 2021-06-22 PROCEDURE — 81003 URINALYSIS AUTO W/O SCOPE: CPT | Performed by: PHYSICIAN ASSISTANT

## 2021-06-22 RX ORDER — DICYCLOMINE HYDROCHLORIDE 20 MG/1
20 TABLET ORAL 2 TIMES DAILY
Qty: 20 TABLET | Refills: 0 | Status: SHIPPED | OUTPATIENT
Start: 2021-06-22 | End: 2021-06-22 | Stop reason: SDUPTHER

## 2021-06-22 RX ORDER — PROMETHAZINE HYDROCHLORIDE 25 MG/1
25 TABLET ORAL EVERY 6 HOURS PRN
Qty: 20 TABLET | Refills: 0 | Status: SHIPPED | OUTPATIENT
Start: 2021-06-22 | End: 2021-06-22 | Stop reason: SDUPTHER

## 2021-06-22 RX ORDER — PROMETHAZINE HYDROCHLORIDE 25 MG/1
25 TABLET ORAL EVERY 6 HOURS PRN
Qty: 20 TABLET | Refills: 0 | Status: SHIPPED | OUTPATIENT
Start: 2021-06-22 | End: 2021-07-09

## 2021-06-22 RX ORDER — DICYCLOMINE HYDROCHLORIDE 20 MG/1
20 TABLET ORAL 2 TIMES DAILY
Qty: 20 TABLET | Refills: 0 | Status: SHIPPED | OUTPATIENT
Start: 2021-06-22 | End: 2021-07-09

## 2021-06-22 RX ADMIN — IOHEXOL 75 ML: 350 INJECTION, SOLUTION INTRAVENOUS at 09:06

## 2021-06-29 ENCOUNTER — PATIENT OUTREACH (OUTPATIENT)
Dept: ADMINISTRATIVE | Facility: HOSPITAL | Age: 71
End: 2021-06-29

## 2021-06-30 ENCOUNTER — OFFICE VISIT (OUTPATIENT)
Dept: INTERNAL MEDICINE | Facility: CLINIC | Age: 71
End: 2021-06-30
Payer: MEDICARE

## 2021-06-30 VITALS
BODY MASS INDEX: 28.75 KG/M2 | HEART RATE: 64 BPM | TEMPERATURE: 98 F | WEIGHT: 162.25 LBS | OXYGEN SATURATION: 99 % | DIASTOLIC BLOOD PRESSURE: 76 MMHG | SYSTOLIC BLOOD PRESSURE: 122 MMHG | HEIGHT: 63 IN

## 2021-06-30 DIAGNOSIS — L03.019 PARONYCHIA OF FINGER, UNSPECIFIED LATERALITY: Primary | ICD-10-CM

## 2021-06-30 PROCEDURE — 3008F BODY MASS INDEX DOCD: CPT | Mod: CPTII,S$GLB,, | Performed by: INTERNAL MEDICINE

## 2021-06-30 PROCEDURE — 1101F PR PT FALLS ASSESS DOC 0-1 FALLS W/OUT INJ PAST YR: ICD-10-PCS | Mod: CPTII,S$GLB,, | Performed by: INTERNAL MEDICINE

## 2021-06-30 PROCEDURE — 1159F MED LIST DOCD IN RCRD: CPT | Mod: S$GLB,,, | Performed by: INTERNAL MEDICINE

## 2021-06-30 PROCEDURE — 3288F FALL RISK ASSESSMENT DOCD: CPT | Mod: CPTII,S$GLB,, | Performed by: INTERNAL MEDICINE

## 2021-06-30 PROCEDURE — 99999 PR PBB SHADOW E&M-EST. PATIENT-LVL V: ICD-10-PCS | Mod: PBBFAC,,, | Performed by: INTERNAL MEDICINE

## 2021-06-30 PROCEDURE — 1101F PT FALLS ASSESS-DOCD LE1/YR: CPT | Mod: CPTII,S$GLB,, | Performed by: INTERNAL MEDICINE

## 2021-06-30 PROCEDURE — 1159F PR MEDICATION LIST DOCUMENTED IN MEDICAL RECORD: ICD-10-PCS | Mod: S$GLB,,, | Performed by: INTERNAL MEDICINE

## 2021-06-30 PROCEDURE — 1125F PR PAIN SEVERITY QUANTIFIED, PAIN PRESENT: ICD-10-PCS | Mod: S$GLB,,, | Performed by: INTERNAL MEDICINE

## 2021-06-30 PROCEDURE — 99203 PR OFFICE/OUTPT VISIT, NEW, LEVL III, 30-44 MIN: ICD-10-PCS | Mod: S$GLB,,, | Performed by: INTERNAL MEDICINE

## 2021-06-30 PROCEDURE — 3008F PR BODY MASS INDEX (BMI) DOCUMENTED: ICD-10-PCS | Mod: CPTII,S$GLB,, | Performed by: INTERNAL MEDICINE

## 2021-06-30 PROCEDURE — 3288F PR FALLS RISK ASSESSMENT DOCUMENTED: ICD-10-PCS | Mod: CPTII,S$GLB,, | Performed by: INTERNAL MEDICINE

## 2021-06-30 PROCEDURE — 99203 OFFICE O/P NEW LOW 30 MIN: CPT | Mod: S$GLB,,, | Performed by: INTERNAL MEDICINE

## 2021-06-30 PROCEDURE — 99999 PR PBB SHADOW E&M-EST. PATIENT-LVL V: CPT | Mod: PBBFAC,,, | Performed by: INTERNAL MEDICINE

## 2021-06-30 PROCEDURE — 1125F AMNT PAIN NOTED PAIN PRSNT: CPT | Mod: S$GLB,,, | Performed by: INTERNAL MEDICINE

## 2021-06-30 RX ORDER — CLINDAMYCIN HYDROCHLORIDE 300 MG/1
300 CAPSULE ORAL 3 TIMES DAILY
Qty: 30 CAPSULE | Refills: 0 | Status: SHIPPED | OUTPATIENT
Start: 2021-06-30

## 2021-07-01 DIAGNOSIS — M79.641 RIGHT HAND PAIN: Primary | ICD-10-CM

## 2021-07-02 ENCOUNTER — HOSPITAL ENCOUNTER (OUTPATIENT)
Dept: RADIOLOGY | Facility: HOSPITAL | Age: 71
Discharge: HOME | End: 2021-07-02
Attending: ORTHOPAEDIC SURGERY
Payer: MEDICARE

## 2021-07-02 ENCOUNTER — OFFICE VISIT (OUTPATIENT)
Dept: ORTHOPEDICS | Facility: CLINIC | Age: 71
End: 2021-07-02
Payer: MEDICARE

## 2021-07-02 VITALS
SYSTOLIC BLOOD PRESSURE: 145 MMHG | HEART RATE: 60 BPM | BODY MASS INDEX: 28.7 KG/M2 | WEIGHT: 162 LBS | DIASTOLIC BLOOD PRESSURE: 73 MMHG | HEIGHT: 63 IN

## 2021-07-02 DIAGNOSIS — L03.011 PARONYCHIA OF FINGER OF RIGHT HAND: ICD-10-CM

## 2021-07-02 DIAGNOSIS — M79.641 RIGHT HAND PAIN: ICD-10-CM

## 2021-07-02 PROCEDURE — 99999 PR PBB SHADOW E&M-EST. PATIENT-LVL V: CPT | Mod: PBBFAC,,, | Performed by: ORTHOPAEDIC SURGERY

## 2021-07-02 PROCEDURE — 73130 XR HAND COMPLETE 3 VIEW RIGHT: ICD-10-PCS | Mod: 26,RT,, | Performed by: RADIOLOGY

## 2021-07-02 PROCEDURE — 73130 X-RAY EXAM OF HAND: CPT | Mod: TC,RT

## 2021-07-02 PROCEDURE — 99213 OFFICE O/P EST LOW 20 MIN: CPT | Mod: S$GLB,,, | Performed by: ORTHOPAEDIC SURGERY

## 2021-07-02 PROCEDURE — 1125F PR PAIN SEVERITY QUANTIFIED, PAIN PRESENT: ICD-10-PCS | Mod: S$GLB,,, | Performed by: ORTHOPAEDIC SURGERY

## 2021-07-02 PROCEDURE — 3008F PR BODY MASS INDEX (BMI) DOCUMENTED: ICD-10-PCS | Mod: CPTII,S$GLB,, | Performed by: ORTHOPAEDIC SURGERY

## 2021-07-02 PROCEDURE — 1159F PR MEDICATION LIST DOCUMENTED IN MEDICAL RECORD: ICD-10-PCS | Mod: S$GLB,,, | Performed by: ORTHOPAEDIC SURGERY

## 2021-07-02 PROCEDURE — 3008F BODY MASS INDEX DOCD: CPT | Mod: CPTII,S$GLB,, | Performed by: ORTHOPAEDIC SURGERY

## 2021-07-02 PROCEDURE — 1159F MED LIST DOCD IN RCRD: CPT | Mod: S$GLB,,, | Performed by: ORTHOPAEDIC SURGERY

## 2021-07-02 PROCEDURE — 99999 PR PBB SHADOW E&M-EST. PATIENT-LVL V: ICD-10-PCS | Mod: PBBFAC,,, | Performed by: ORTHOPAEDIC SURGERY

## 2021-07-02 PROCEDURE — 1125F AMNT PAIN NOTED PAIN PRSNT: CPT | Mod: S$GLB,,, | Performed by: ORTHOPAEDIC SURGERY

## 2021-07-02 PROCEDURE — 99213 PR OFFICE/OUTPT VISIT, EST, LEVL III, 20-29 MIN: ICD-10-PCS | Mod: S$GLB,,, | Performed by: ORTHOPAEDIC SURGERY

## 2021-07-02 PROCEDURE — 73130 X-RAY EXAM OF HAND: CPT | Mod: 26,RT,, | Performed by: RADIOLOGY

## 2021-07-09 ENCOUNTER — OFFICE VISIT (OUTPATIENT)
Dept: ORTHOPEDICS | Facility: CLINIC | Age: 71
End: 2021-07-09
Payer: MEDICARE

## 2021-07-09 VITALS
WEIGHT: 162.5 LBS | HEART RATE: 73 BPM | BODY MASS INDEX: 28.79 KG/M2 | SYSTOLIC BLOOD PRESSURE: 144 MMHG | HEIGHT: 63 IN | DIASTOLIC BLOOD PRESSURE: 77 MMHG

## 2021-07-09 DIAGNOSIS — S46.011D TRAUMATIC INCOMPLETE TEAR OF RIGHT ROTATOR CUFF, SUBSEQUENT ENCOUNTER: ICD-10-CM

## 2021-07-09 DIAGNOSIS — L03.011 PARONYCHIA OF FINGER OF RIGHT HAND: Primary | ICD-10-CM

## 2021-07-09 PROCEDURE — 1125F AMNT PAIN NOTED PAIN PRSNT: CPT | Mod: S$GLB,,, | Performed by: ORTHOPAEDIC SURGERY

## 2021-07-09 PROCEDURE — 3008F BODY MASS INDEX DOCD: CPT | Mod: CPTII,S$GLB,, | Performed by: ORTHOPAEDIC SURGERY

## 2021-07-09 PROCEDURE — 1125F PR PAIN SEVERITY QUANTIFIED, PAIN PRESENT: ICD-10-PCS | Mod: S$GLB,,, | Performed by: ORTHOPAEDIC SURGERY

## 2021-07-09 PROCEDURE — 3008F PR BODY MASS INDEX (BMI) DOCUMENTED: ICD-10-PCS | Mod: CPTII,S$GLB,, | Performed by: ORTHOPAEDIC SURGERY

## 2021-07-09 PROCEDURE — 1159F PR MEDICATION LIST DOCUMENTED IN MEDICAL RECORD: ICD-10-PCS | Mod: S$GLB,,, | Performed by: ORTHOPAEDIC SURGERY

## 2021-07-09 PROCEDURE — 99999 PR PBB SHADOW E&M-EST. PATIENT-LVL V: CPT | Mod: PBBFAC,,, | Performed by: ORTHOPAEDIC SURGERY

## 2021-07-09 PROCEDURE — 1159F MED LIST DOCD IN RCRD: CPT | Mod: S$GLB,,, | Performed by: ORTHOPAEDIC SURGERY

## 2021-07-09 PROCEDURE — 99213 OFFICE O/P EST LOW 20 MIN: CPT | Mod: S$GLB,,, | Performed by: ORTHOPAEDIC SURGERY

## 2021-07-09 PROCEDURE — 99213 PR OFFICE/OUTPT VISIT, EST, LEVL III, 20-29 MIN: ICD-10-PCS | Mod: S$GLB,,, | Performed by: ORTHOPAEDIC SURGERY

## 2021-07-09 PROCEDURE — 99999 PR PBB SHADOW E&M-EST. PATIENT-LVL V: ICD-10-PCS | Mod: PBBFAC,,, | Performed by: ORTHOPAEDIC SURGERY

## 2022-03-19 ENCOUNTER — HOSPITAL ENCOUNTER (EMERGENCY)
Facility: HOSPITAL | Age: 72
Discharge: HOME | End: 2022-03-19
Attending: FAMILY MEDICINE
Payer: MEDICARE

## 2022-03-19 VITALS
WEIGHT: 152.13 LBS | SYSTOLIC BLOOD PRESSURE: 165 MMHG | BODY MASS INDEX: 26.95 KG/M2 | HEART RATE: 54 BPM | RESPIRATION RATE: 20 BRPM | DIASTOLIC BLOOD PRESSURE: 73 MMHG | OXYGEN SATURATION: 100 % | TEMPERATURE: 98 F

## 2022-03-19 DIAGNOSIS — R10.30 LOWER ABDOMINAL PAIN: Primary | ICD-10-CM

## 2022-03-19 LAB
ALBUMIN SERPL BCP-MCNC: 3.6 G/DL (ref 3.5–5.2)
ALP SERPL-CCNC: 102 U/L (ref 55–135)
ALT SERPL W/O P-5'-P-CCNC: 13 U/L (ref 10–44)
ANION GAP SERPL CALC-SCNC: 12 MMOL/L (ref 8–16)
AST SERPL-CCNC: 19 U/L (ref 10–40)
BASOPHILS # BLD AUTO: 0.03 K/UL (ref 0–0.2)
BASOPHILS NFR BLD: 0.3 % (ref 0–1.9)
BILIRUB SERPL-MCNC: 0.5 MG/DL (ref 0.1–1)
BILIRUB UR QL STRIP: NEGATIVE
BUN SERPL-MCNC: 10 MG/DL (ref 8–23)
CALCIUM SERPL-MCNC: 9.1 MG/DL (ref 8.7–10.5)
CHLORIDE SERPL-SCNC: 103 MMOL/L (ref 95–110)
CLARITY UR: CLEAR
CO2 SERPL-SCNC: 25 MMOL/L (ref 23–29)
COLOR UR: YELLOW
CREAT SERPL-MCNC: 0.8 MG/DL (ref 0.5–1.4)
DIFFERENTIAL METHOD: ABNORMAL
EOSINOPHIL # BLD AUTO: 0 K/UL (ref 0–0.5)
EOSINOPHIL NFR BLD: 0.1 % (ref 0–8)
ERYTHROCYTE [DISTWIDTH] IN BLOOD BY AUTOMATED COUNT: 15.1 % (ref 11.5–14.5)
EST. GFR  (AFRICAN AMERICAN): >60 ML/MIN/1.73 M^2
EST. GFR  (NON AFRICAN AMERICAN): >60 ML/MIN/1.73 M^2
GLUCOSE SERPL-MCNC: 158 MG/DL (ref 70–110)
GLUCOSE UR QL STRIP: NEGATIVE
HCT VFR BLD AUTO: 38.8 % (ref 37–48.5)
HGB BLD-MCNC: 12.2 G/DL (ref 12–16)
HGB UR QL STRIP: ABNORMAL
IMM GRANULOCYTES # BLD AUTO: 0.02 K/UL (ref 0–0.04)
IMM GRANULOCYTES NFR BLD AUTO: 0.2 % (ref 0–0.5)
KETONES UR QL STRIP: NEGATIVE
LEUKOCYTE ESTERASE UR QL STRIP: ABNORMAL
LIPASE SERPL-CCNC: 27 U/L (ref 4–60)
LYMPHOCYTES # BLD AUTO: 2.8 K/UL (ref 1–4.8)
LYMPHOCYTES NFR BLD: 32.3 % (ref 18–48)
MCH RBC QN AUTO: 25.7 PG (ref 27–31)
MCHC RBC AUTO-ENTMCNC: 31.4 G/DL (ref 32–36)
MCV RBC AUTO: 82 FL (ref 82–98)
MICROSCOPIC COMMENT: NORMAL
MONOCYTES # BLD AUTO: 0.8 K/UL (ref 0.3–1)
MONOCYTES NFR BLD: 9.3 % (ref 4–15)
NEUTROPHILS # BLD AUTO: 5.1 K/UL (ref 1.8–7.7)
NEUTROPHILS NFR BLD: 57.8 % (ref 38–73)
NITRITE UR QL STRIP: NEGATIVE
NRBC BLD-RTO: 0 /100 WBC
PH UR STRIP: 6 [PH] (ref 5–8)
PLATELET # BLD AUTO: 309 K/UL (ref 150–450)
PMV BLD AUTO: 11.4 FL (ref 9.2–12.9)
POTASSIUM SERPL-SCNC: 2.9 MMOL/L (ref 3.5–5.1)
PROT SERPL-MCNC: 7.4 G/DL (ref 6–8.4)
PROT UR QL STRIP: NEGATIVE
RBC # BLD AUTO: 4.75 M/UL (ref 4–5.4)
RBC #/AREA URNS HPF: 0 /HPF (ref 0–4)
SODIUM SERPL-SCNC: 140 MMOL/L (ref 136–145)
SP GR UR STRIP: 1.01 (ref 1–1.03)
SQUAMOUS #/AREA URNS HPF: 1 /HPF
URN SPEC COLLECT METH UR: ABNORMAL
UROBILINOGEN UR STRIP-ACNC: NEGATIVE EU/DL
WBC # BLD AUTO: 8.75 K/UL (ref 3.9–12.7)
WBC #/AREA URNS HPF: 2 /HPF (ref 0–5)

## 2022-03-19 PROCEDURE — 83690 ASSAY OF LIPASE: CPT | Performed by: FAMILY MEDICINE

## 2022-03-19 PROCEDURE — 85025 COMPLETE CBC W/AUTO DIFF WBC: CPT | Performed by: FAMILY MEDICINE

## 2022-03-19 PROCEDURE — 25000003 PHARM REV CODE 250: Performed by: FAMILY MEDICINE

## 2022-03-19 PROCEDURE — 80053 COMPREHEN METABOLIC PANEL: CPT | Performed by: FAMILY MEDICINE

## 2022-03-19 PROCEDURE — 81000 URINALYSIS NONAUTO W/SCOPE: CPT | Performed by: FAMILY MEDICINE

## 2022-03-19 PROCEDURE — 99284 EMERGENCY DEPT VISIT MOD MDM: CPT | Mod: 25

## 2022-03-19 RX ORDER — ONDANSETRON 4 MG/1
4 TABLET, FILM COATED ORAL EVERY 6 HOURS
Qty: 12 TABLET | Refills: 0 | Status: SHIPPED | OUTPATIENT
Start: 2022-03-19

## 2022-03-19 RX ORDER — MAG HYDROX/ALUMINUM HYD/SIMETH 200-200-20
30 SUSPENSION, ORAL (FINAL DOSE FORM) ORAL ONCE
Status: COMPLETED | OUTPATIENT
Start: 2022-03-19 | End: 2022-03-19

## 2022-03-19 RX ORDER — LIDOCAINE HYDROCHLORIDE 20 MG/ML
10 SOLUTION OROPHARYNGEAL ONCE
Status: COMPLETED | OUTPATIENT
Start: 2022-03-19 | End: 2022-03-19

## 2022-03-19 RX ADMIN — LIDOCAINE HYDROCHLORIDE 10 ML: 20 SOLUTION ORAL; TOPICAL at 07:03

## 2022-03-19 RX ADMIN — MAGNESIUM HYDROXIDE/ALUMINUM HYDROXICE/SIMETHICONE 30 ML: 120; 1200; 1200 SUSPENSION ORAL at 07:03

## 2022-03-19 NOTE — ED PROVIDER NOTES
SCRIBE #1 NOTE: I, Madison Cooley, am scribing for, and in the presence of, Barbara Snyder MD. I have scribed the entire note.       History     Chief Complaint   Patient presents with    Abdominal Pain     Stomach pain, lost 15 lbs over 2 days, weak, nausea, decreased appetite, oily stools     Review of patient's allergies indicates:   Allergen Reactions    Sulfa (sulfonamide antibiotics) Rash         History of Present Illness     HPI    3/19/2022, 6:16 PM  History obtained from the patient      History of Present Illness: Coral Gamboa is a 71 y.o. female patient with a PMHx of GERD, DM, and HTN who presents to the Emergency Department for evaluation of abd pain which onset gradually pta. Pt reports losing 15 pounds in 2 days. Symptoms are constant and moderate in severity. No mitigating or exacerbating factors reported. Associated sxs include weakness, nausea, decreased appetite, oil stools. Patient denies any fever, SOB, CP, vomiting, constipation, and all other sxs at this time. No prior Tx reported. No further complaints or concerns at this time.       Arrival mode: Personal vehicle    PCP: Ravindra Rincon MD        Past Medical History:  Past Medical History:   Diagnosis Date    Diabetes mellitus without complication     GERD (gastroesophageal reflux disease)     Hypertension associated with diabetes     Sarcoidosis        Past Surgical History:  Past Surgical History:   Procedure Laterality Date    EYE SURGERY      TUBAL LIGATION      TUBAL LIGATION      Reversal         Family History:  No family history on file.    Social History:  Social History     Tobacco Use    Smoking status: Former Smoker    Smokeless tobacco: Never Used   Substance and Sexual Activity    Alcohol use: No    Drug use: No    Sexual activity: Not on file        Review of Systems     Review of Systems   Constitutional: Positive for appetite change (decreased). Negative for fever.   HENT: Negative for sore throat.     Respiratory: Negative for shortness of breath.    Cardiovascular: Negative for chest pain.   Gastrointestinal: Positive for abdominal pain and nausea. Negative for constipation and vomiting.        (+) oily stools   Genitourinary: Negative for dysuria.   Musculoskeletal: Negative for back pain.   Skin: Negative for rash.   Neurological: Positive for weakness.   Hematological: Does not bruise/bleed easily.   All other systems reviewed and are negative.       Physical Exam     Initial Vitals [03/19/22 1551]   BP Pulse Resp Temp SpO2   (!) 192/76 96 20 98.1 °F (36.7 °C) 100 %      MAP       --          Physical Exam  Nursing Notes and Vital Signs Reviewed.  Constitutional: Patient is in no acute distress. Well-developed and well-nourished.  Head: Atraumatic. Normocephalic.  Eyes: PERRL. EOM intact. Conjunctivae are not pale. No scleral icterus.  ENT: Mucous membranes are moist. Oropharynx is clear and symmetric.    Neck: Supple. Full ROM. No lymphadenopathy.  Cardiovascular: Regular rate. Regular rhythm. No murmurs, rubs, or gallops. Distal pulses are 2+ and symmetric.  Pulmonary/Chest: Expiratory wheezing in all lung fields.   Abdominal: Soft and non-distended.  Left lower quadrant tenderness..  No rebound, guarding, or rigidity. Good bowel sounds.  Genitourinary: No CVA tenderness  Musculoskeletal: Moves all extremities. No obvious deformities. No edema. No calf tenderness.  Skin: Warm and dry.  Neurological:  Alert, awake, and appropriate.  Normal speech.  No acute focal neurological deficits are appreciated.  Psychiatric: Normal affect. Good eye contact. Appropriate in content.     ED Course   Procedures  ED Vital Signs:  Vitals:    03/19/22 1551 03/19/22 1709 03/19/22 1904 03/19/22 1931   BP: (!) 192/76 (!) 168/80 (!) 181/79 (!) 165/73   Pulse: 96 60 (!) 54    Resp: 20 20     Temp: 98.1 °F (36.7 °C)      TempSrc: Oral      SpO2: 100% 98% 100%    Weight: 69 kg (152 lb 1.9 oz)          Abnormal Lab Results:  Labs  Reviewed   CBC W/ AUTO DIFFERENTIAL - Abnormal; Notable for the following components:       Result Value    MCH 25.7 (*)     MCHC 31.4 (*)     RDW 15.1 (*)     All other components within normal limits   URINALYSIS - Abnormal; Notable for the following components:    Occult Blood UA Trace (*)     Leukocytes, UA Trace (*)     All other components within normal limits   COMPREHENSIVE METABOLIC PANEL - Abnormal; Notable for the following components:    Potassium 2.9 (*)     Glucose 158 (*)     All other components within normal limits   URINALYSIS MICROSCOPIC   LIPASE        All Lab Results:  Results for orders placed or performed during the hospital encounter of 03/19/22   CBC auto differential   Result Value Ref Range    WBC 8.75 3.90 - 12.70 K/uL    RBC 4.75 4.00 - 5.40 M/uL    Hemoglobin 12.2 12.0 - 16.0 g/dL    Hematocrit 38.8 37.0 - 48.5 %    MCV 82 82 - 98 fL    MCH 25.7 (L) 27.0 - 31.0 pg    MCHC 31.4 (L) 32.0 - 36.0 g/dL    RDW 15.1 (H) 11.5 - 14.5 %    Platelets 309 150 - 450 K/uL    MPV 11.4 9.2 - 12.9 fL    Immature Granulocytes 0.2 0.0 - 0.5 %    Gran # (ANC) 5.1 1.8 - 7.7 K/uL    Immature Grans (Abs) 0.02 0.00 - 0.04 K/uL    Lymph # 2.8 1.0 - 4.8 K/uL    Mono # 0.8 0.3 - 1.0 K/uL    Eos # 0.0 0.0 - 0.5 K/uL    Baso # 0.03 0.00 - 0.20 K/uL    nRBC 0 0 /100 WBC    Gran % 57.8 38.0 - 73.0 %    Lymph % 32.3 18.0 - 48.0 %    Mono % 9.3 4.0 - 15.0 %    Eosinophil % 0.1 0.0 - 8.0 %    Basophil % 0.3 0.0 - 1.9 %    Differential Method Automated    Urinalysis - Clean Catch   Result Value Ref Range    Specimen UA Urine, Clean Catch     Color, UA Yellow Yellow, Straw, Ileana    Appearance, UA Clear Clear    pH, UA 6.0 5.0 - 8.0    Specific Gravity, UA 1.010 1.005 - 1.030    Protein, UA Negative Negative    Glucose, UA Negative Negative    Ketones, UA Negative Negative    Bilirubin (UA) Negative Negative    Occult Blood UA Trace (A) Negative    Nitrite, UA Negative Negative    Urobilinogen, UA Negative <2.0 EU/dL     Leukocytes, UA Trace (A) Negative   Urinalysis Microscopic   Result Value Ref Range    RBC, UA 0 0 - 4 /hpf    WBC, UA 2 0 - 5 /hpf    Squam Epithel, UA 1 /hpf    Microscopic Comment SEE COMMENT    Comprehensive metabolic panel   Result Value Ref Range    Sodium 140 136 - 145 mmol/L    Potassium 2.9 (L) 3.5 - 5.1 mmol/L    Chloride 103 95 - 110 mmol/L    CO2 25 23 - 29 mmol/L    Glucose 158 (H) 70 - 110 mg/dL    BUN 10 8 - 23 mg/dL    Creatinine 0.8 0.5 - 1.4 mg/dL    Calcium 9.1 8.7 - 10.5 mg/dL    Total Protein 7.4 6.0 - 8.4 g/dL    Albumin 3.6 3.5 - 5.2 g/dL    Total Bilirubin 0.5 0.1 - 1.0 mg/dL    Alkaline Phosphatase 102 55 - 135 U/L    AST 19 10 - 40 U/L    ALT 13 10 - 44 U/L    Anion Gap 12 8 - 16 mmol/L    eGFR if African American >60 >60 mL/min/1.73 m^2    eGFR if non African American >60 >60 mL/min/1.73 m^2   Lipase   Result Value Ref Range    Lipase 27 4 - 60 U/L         Imaging Results:  Imaging Results          CT Abdomen Pelvis  Without Contrast (Final result)  Result time 03/19/22 19:10:39    Final result by Casey Mixon MD (03/19/22 19:10:39)                 Impression:      No acute process.    All CT scans   are performed using dose optimization techniques including the following: automated exposure control; adjustment of the mA and/or kV; use of iterative reconstruction technique.  Dose modulation was employed for ALARA by means of: Automated exposure control; adjustment of the mA and/or kV according to patient size (this includes techniques or standardized protocols for targeted exams where dose is matched to indication/reason for exam; i.e. extremities or head); and/or use of iterative reconstructive technique.      Electronically signed by: Oral Peña  Date:    03/19/2022  Time:    19:10             Narrative:    EXAMINATION:  CT ABDOMEN PELVIS WITHOUT CONTRAST    CLINICAL HISTORY:  Abdominal pain, acute, nonlocalized;    TECHNIQUE:  Low dose axial images, sagittal and coronal  reformations were obtained from the lung bases to the pubic symphysis, Oral contrast was not administered.    COMPARISON:  None    FINDINGS:  Heart: Normal in size as far as seen.  No pericardial effusion as far seen.    Lung Bases: Well aerated, without consolidation or pleural fluid.    Liver: Normal in size and attenuation, with no focal hepatic lesions.    Gallbladder: No calcified gallstones.    Bile Ducts: No evidence of dilated ducts.    Pancreas: No mass or peripancreatic fat stranding.    Spleen: Unremarkable.    Adrenals: Unremarkable.    Kidneys/ Ureters: Unremarkable.    Bladder: No evidence of wall thickening.    Reproductive organs: Unremarkable.    GI Tract/Mesentery: No evidence of bowel obstruction or inflammation. No secondary signs of appendicitis.    Peritoneal Space: No ascites. No free air.    Retroperitoneum: No significant adenopathy.    Abdominal wall: Fat containing umbilical hernia.    Vasculature: No aneurysm.  Atherosclerotic changes.    Bones: No acute fracture.                                        The Emergency Provider reviewed the vital signs and test results, which are outlined above.     ED Discussion       8:05 PM: Reassessed pt at this time. Discussed with pt all pertinent ED information and results. Discussed pt dx and plan of tx. Gave pt all f/u and return to the ED instructions. All questions and concerns were addressed at this time. Pt expresses understanding of information and instructions, and is comfortable with plan to discharge. Pt is stable for discharge.         Medical Decision Making:   Clinical Tests:   Lab Tests: Ordered and Reviewed  Radiological Study: Ordered and Reviewed           ED Medication(s):  Medications   aluminum-magnesium hydroxide-simethicone 200-200-20 mg/5 mL suspension 30 mL (30 mLs Oral Given 3/19/22 1938)     And   LIDOcaine HCl 2% oral solution 10 mL (10 mLs Oral Given 3/19/22 1938)       Discharge Medication List as of 3/19/2022  8:04 PM       START taking these medications    Details   ondansetron (ZOFRAN) 4 MG tablet Take 1 tablet (4 mg total) by mouth every 6 (six) hours., Starting Sat 3/19/2022, Print              Follow-up Information     Ravindra Rincon MD. Schedule an appointment as soon as possible for a visit in 1 week.    Specialty: Family Medicine  Contact information:  [id number] [facility]  [place] [address]  [phone]                             Scribe Attestation:   Scribe #1: I performed the above scribed service and the documentation accurately describes the services I performed. I attest to the accuracy of the note.     Attending:   Physician Attestation Statement for Scribe #1: I, Barbara Snyder MD, personally performed the services described in this documentation, as scribed by Madison Cooley, in my presence, and it is both accurate and complete.           Clinical Impression       ICD-10-CM ICD-9-CM   1. Lower abdominal pain  R10.30 789.09       Disposition:   Disposition: Discharged  Condition: Stable         Barbara Snyder MD  03/20/22 5721

## 2023-08-17 ENCOUNTER — HOSPITAL ENCOUNTER (EMERGENCY)
Facility: HOSPITAL | Age: 73
Discharge: HOME | End: 2023-08-17
Attending: EMERGENCY MEDICINE
Payer: MEDICARE

## 2023-08-17 VITALS
BODY MASS INDEX: 29.95 KG/M2 | RESPIRATION RATE: 18 BRPM | OXYGEN SATURATION: 97 % | DIASTOLIC BLOOD PRESSURE: 85 MMHG | TEMPERATURE: 98 F | WEIGHT: 169.06 LBS | SYSTOLIC BLOOD PRESSURE: 188 MMHG | HEART RATE: 68 BPM

## 2023-08-17 DIAGNOSIS — M79.605 LEFT LEG PAIN: ICD-10-CM

## 2023-08-17 PROCEDURE — 99284 EMERGENCY DEPT VISIT MOD MDM: CPT | Mod: 25

## 2023-08-17 RX ORDER — PREDNISONE 20 MG/1
20 TABLET ORAL DAILY
Qty: 5 TABLET | Refills: 0 | Status: SHIPPED | OUTPATIENT
Start: 2023-08-17 | End: 2023-08-22

## 2023-08-18 NOTE — ED PROVIDER NOTES
HISTORY     Chief Complaint   Patient presents with    Leg Pain     Pt reports pain to R. Leg that started today. Pt denies any injury to leg or swelling to leg. Pt reports difficulty placing weight on leg.      Review of patient's allergies indicates:   Allergen Reactions    Sulfa (sulfonamide antibiotics) Rash        HPI   73 year old female presents to the er for left leg pain. Denies injury. Denies previous treatment. Pt denies fever, chills, chest pain, sob, back pain, abdominal pain, n/v/d, dysuria, and all other concerns     The history is provided by the patient. No  was used.        PCP: Ravindra Rincon MD     Past Medical History:  Past Medical History:   Diagnosis Date    Diabetes mellitus without complication     GERD (gastroesophageal reflux disease)     Hypertension associated with diabetes     Sarcoidosis         Past Surgical History:  Past Surgical History:   Procedure Laterality Date    EYE SURGERY      TUBAL LIGATION      TUBAL LIGATION      Reversal        Family History:  No family history on file.     Social History:  Social History     Tobacco Use    Smoking status: Former     Current packs/day: 0.00    Smokeless tobacco: Never   Substance and Sexual Activity    Alcohol use: No    Drug use: No    Sexual activity: Not on file         ROS   Review of Systems   Constitutional:  Negative for fever.   HENT:  Negative for sore throat.    Respiratory:  Negative for shortness of breath.    Cardiovascular:  Negative for chest pain.   Gastrointestinal:  Negative for abdominal pain, nausea and vomiting.   Genitourinary:  Negative for dysuria.   Musculoskeletal:  Positive for myalgias. Negative for back pain.   Skin:  Negative for rash.   Neurological:  Negative for weakness.   Hematological:  Does not bruise/bleed easily.       PHYSICAL EXAM     Initial Vitals [08/17/23 1400]   BP Pulse Resp Temp SpO2   (!) 186/72 68 18 97.9 °F (36.6 °C) 98 %      MAP       --            Physical Exam    Nursing note and vitals reviewed.  Constitutional: She appears well-developed and well-nourished. She is not diaphoretic. No distress.   HENT:   Head: Normocephalic and atraumatic.   Eyes: Right eye exhibits no discharge. Left eye exhibits no discharge.   Neck: Neck supple.   Normal range of motion.  Cardiovascular:  Normal rate.           Pulmonary/Chest: No respiratory distress.   Abdominal: Abdomen is soft. She exhibits no distension.   Musculoskeletal:         General: Normal range of motion.      Cervical back: Normal range of motion and neck supple.      Comments: Lle without obvious deformity. Distal pulses 2+. nvi     Neurological: She is alert and oriented to person, place, and time. She has normal strength.   Skin: Skin is warm and dry.   Psychiatric: She has a normal mood and affect. Her behavior is normal. Thought content normal.          ED COURSE   Procedures  ED ONGOING VITALS:  Vitals:    08/17/23 1400 08/17/23 1619   BP: (!) 186/72 (!) 188/85   Pulse: 68 68   Resp: 18    Temp: 97.9 °F (36.6 °C) 97.8 °F (36.6 °C)   TempSrc: Oral Oral   SpO2: 98% 97%   Weight: 76.7 kg (169 lb 1.5 oz)          ABNORMAL LAB VALUES:  Labs Reviewed - No data to display      ALL LAB VALUES:  none      RADIOLOGY STUDIES:  Imaging Results              US Lower Extremity Veins Left (Final result)  Result time 08/17/23 16:15:15      Final result by Jorge Messina MD (Timothy) (08/17/23 16:15:15)                   Impression:      No evidence of deep venous thrombosis left lower extremity.      Electronically signed by: Jorge Messina MD  Date:    08/17/2023  Time:    16:15               Narrative:    EXAMINATION:  US LOWER EXTREMITY VEINS LEFT    CLINICAL HISTORY:  Pain in left leg    FINDINGS:  Color flow and spectral doppler vascular ultrasound was performed. There is documented compressibility and color Doppler flow left lower extremity with normal venous waveforms and good calf augmentation  response.                                                The above vital signs and test results have been reviewed by the emergency provider.     ED Medications:  Discharge Medication List as of 8/17/2023  4:32 PM        START taking these medications    Details   predniSONE (DELTASONE) 20 MG tablet Take 1 tablet (20 mg total) by mouth once daily. for 5 days, Starting u 8/17/2023, Until Tue 8/22/2023, Print           Discharge Medications:  Discharge Medication List as of 8/17/2023  4:32 PM        START taking these medications    Details   predniSONE (DELTASONE) 20 MG tablet Take 1 tablet (20 mg total) by mouth once daily. for 5 days, Starting u 8/17/2023, Until Tue 8/22/2023, Print             Follow-up Information       pcp of choice.    Why: As needed             [facility] - Emergency Dept..    Specialty: Emergency Medicine  Why: If symptoms worsen  Contact information:  [address] [facility] [address] [phone]                          I discussed with patient and/or family/caretaker that evaluation in the ED does not suggest any emergent or life threatening medical conditions requiring immediate intervention beyond what was provided in the ED, and I believe patient is safe for discharge. Regardless, an unremarkable evaluation in the ED does not preclude the development or presence of a serious or life threatening condition. As such, patient was instructed to return immediately for any worsening or change in current symptoms.        MEDICAL DECISION MAKING                 CLINICAL IMPRESSION       ICD-10-CM ICD-9-CM   1. Left leg pain  M79.605 729.5       Disposition:   Disposition: Discharged  Condition: Stable         Baltazar Marquis, NP  08/17/23 6366

## 2024-02-21 ENCOUNTER — HOSPITAL ENCOUNTER (EMERGENCY)
Facility: HOSPITAL | Age: 74
Discharge: HOME | End: 2024-02-21
Attending: EMERGENCY MEDICINE
Payer: MEDICARE

## 2024-02-21 ENCOUNTER — TELEPHONE (OUTPATIENT)
Dept: ORTHOPEDICS | Facility: CLINIC | Age: 74
End: 2024-02-21
Payer: MEDICARE

## 2024-02-21 VITALS
SYSTOLIC BLOOD PRESSURE: 148 MMHG | HEIGHT: 64 IN | RESPIRATION RATE: 20 BRPM | TEMPERATURE: 98 F | HEART RATE: 84 BPM | BODY MASS INDEX: 28.76 KG/M2 | WEIGHT: 168.44 LBS | OXYGEN SATURATION: 98 % | DIASTOLIC BLOOD PRESSURE: 77 MMHG

## 2024-02-21 DIAGNOSIS — S59.901A ELBOW INJURY, RIGHT, INITIAL ENCOUNTER: ICD-10-CM

## 2024-02-21 DIAGNOSIS — S82.141A CLOSED FRACTURE OF RIGHT TIBIAL PLATEAU, INITIAL ENCOUNTER: Primary | ICD-10-CM

## 2024-02-21 DIAGNOSIS — M79.604 RIGHT LEG PAIN: ICD-10-CM

## 2024-02-21 DIAGNOSIS — S89.91XA RIGHT KNEE INJURY: ICD-10-CM

## 2024-02-21 LAB — POCT GLUCOSE: 136 MG/DL (ref 70–110)

## 2024-02-21 PROCEDURE — 90714 TD VACC NO PRESV 7 YRS+ IM: CPT | Performed by: EMERGENCY MEDICINE

## 2024-02-21 PROCEDURE — 90471 IMMUNIZATION ADMIN: CPT | Performed by: EMERGENCY MEDICINE

## 2024-02-21 PROCEDURE — 25000003 PHARM REV CODE 250: Performed by: EMERGENCY MEDICINE

## 2024-02-21 PROCEDURE — 99285 EMERGENCY DEPT VISIT HI MDM: CPT | Mod: 25

## 2024-02-21 PROCEDURE — 63600175 PHARM REV CODE 636 W HCPCS: Performed by: EMERGENCY MEDICINE

## 2024-02-21 PROCEDURE — 29505 APPLICATION LONG LEG SPLINT: CPT | Mod: RT

## 2024-02-21 PROCEDURE — 82962 GLUCOSE BLOOD TEST: CPT

## 2024-02-21 RX ORDER — HYDROCODONE BITARTRATE AND ACETAMINOPHEN 10; 325 MG/1; MG/1
1 TABLET ORAL
Status: COMPLETED | OUTPATIENT
Start: 2024-02-21 | End: 2024-02-21

## 2024-02-21 RX ORDER — HYDROCODONE BITARTRATE AND ACETAMINOPHEN 5; 325 MG/1; MG/1
1 TABLET ORAL EVERY 6 HOURS PRN
Qty: 18 TABLET | Refills: 0 | Status: SHIPPED | OUTPATIENT
Start: 2024-02-21

## 2024-02-21 RX ORDER — OXYCODONE HYDROCHLORIDE 5 MG/1
5 TABLET ORAL
Status: COMPLETED | OUTPATIENT
Start: 2024-02-21 | End: 2024-02-21

## 2024-02-21 RX ADMIN — HYDROCODONE BITARTRATE AND ACETAMINOPHEN 1 TABLET: 10; 325 TABLET ORAL at 09:02

## 2024-02-21 RX ADMIN — CLOSTRIDIUM TETANI TOXOID ANTIGEN (FORMALDEHYDE INACTIVATED) AND CORYNEBACTERIUM DIPHTHERIAE TOXOID ANTIGEN (FORMALDEHYDE INACTIVATED) 0.5 ML: 5; 2 INJECTION, SUSPENSION INTRAMUSCULAR at 07:02

## 2024-02-21 RX ADMIN — OXYCODONE HYDROCHLORIDE 5 MG: 5 TABLET ORAL at 11:02
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
## 2024-02-21 NOTE — TELEPHONE ENCOUNTER
Phoned patient to schedule her ER follow up appointment. Left a message for her to call back to schedule an appointment for Monday 02/26/2024.

## 2024-02-23 ENCOUNTER — TELEPHONE (OUTPATIENT)
Dept: ORTHOPEDICS | Facility: CLINIC | Age: 74
End: 2024-02-23
Payer: MEDICARE

## 2024-02-23 NOTE — TELEPHONE ENCOUNTER
----- Message from Romi Dorantes MA sent at 2/21/2024  2:58 PM CST -----  Regarding: EDFU Appointment  Phoned patient to schedule her ER follow up appointment. Left a message for her to call back to schedule an appointment for Monday 02/26/2024.

## 2024-02-23 NOTE — TELEPHONE ENCOUNTER
----- Message from Barak Martin sent at 2/23/2024 10:55 AM CST -----  Contact: Coral  Type:  Patient Returning Call    Who Called:Coral  Who Left Message for Patient:nurse  Does the patient know what this is regarding?:missed call  Would the patient rather a call back or a response via [facility]? call  Best Call Back Number:[phone]   Additional Information:

## 2024-02-23 NOTE — TELEPHONE ENCOUNTER
Returned call to patient to schedule her ER follow up appointment and patient states that she has an Orthopedic physician that she already sees. Understanding verbalized.

## 2025-05-25 ENCOUNTER — HOSPITAL ENCOUNTER (EMERGENCY)
Facility: HOSPITAL | Age: 75
Discharge: HOME | End: 2025-05-25
Attending: EMERGENCY MEDICINE
Payer: MEDICARE

## 2025-05-25 VITALS
HEIGHT: 64 IN | OXYGEN SATURATION: 91 % | HEART RATE: 61 BPM | SYSTOLIC BLOOD PRESSURE: 178 MMHG | BODY MASS INDEX: 27.48 KG/M2 | WEIGHT: 160.94 LBS | RESPIRATION RATE: 15 BRPM | DIASTOLIC BLOOD PRESSURE: 77 MMHG | TEMPERATURE: 99 F

## 2025-05-25 DIAGNOSIS — N39.0 URINARY TRACT INFECTION WITH HEMATURIA, SITE UNSPECIFIED: ICD-10-CM

## 2025-05-25 DIAGNOSIS — R60.0 LEG EDEMA, RIGHT: ICD-10-CM

## 2025-05-25 DIAGNOSIS — R51.9 NONINTRACTABLE HEADACHE, UNSPECIFIED CHRONICITY PATTERN, UNSPECIFIED HEADACHE TYPE: ICD-10-CM

## 2025-05-25 DIAGNOSIS — R31.9 URINARY TRACT INFECTION WITH HEMATURIA, SITE UNSPECIFIED: ICD-10-CM

## 2025-05-25 DIAGNOSIS — M79.89 RIGHT LEG SWELLING: Primary | ICD-10-CM

## 2025-05-25 DIAGNOSIS — R60.0 PEDAL EDEMA: ICD-10-CM

## 2025-05-25 DIAGNOSIS — I10 PRIMARY HYPERTENSION: ICD-10-CM

## 2025-05-25 LAB
ABSOLUTE EOSINOPHIL (OHS): 0.04 K/UL
ABSOLUTE MONOCYTE (OHS): 0.54 K/UL (ref 0.3–1)
ABSOLUTE NEUTROPHIL COUNT (OHS): 4 K/UL (ref 1.8–7.7)
ALBUMIN SERPL BCP-MCNC: 3.5 G/DL (ref 3.5–5.2)
ALP SERPL-CCNC: 169 UNIT/L (ref 40–150)
ALT SERPL W/O P-5'-P-CCNC: 64 UNIT/L (ref 10–44)
ANION GAP (OHS): 14 MMOL/L (ref 8–16)
AST SERPL-CCNC: 60 UNIT/L (ref 11–45)
BACTERIA #/AREA URNS AUTO: ABNORMAL /HPF
BASOPHILS # BLD AUTO: 0.05 K/UL
BASOPHILS NFR BLD AUTO: 0.7 %
BILIRUB SERPL-MCNC: 0.3 MG/DL (ref 0.1–1)
BILIRUB UR QL STRIP.AUTO: NEGATIVE
BNP SERPL-MCNC: 25 PG/ML (ref 0–99)
BUN SERPL-MCNC: 15 MG/DL (ref 8–23)
CALCIUM SERPL-MCNC: 9.2 MG/DL (ref 8.7–10.5)
CHLORIDE SERPL-SCNC: 101 MMOL/L (ref 95–110)
CLARITY UR: ABNORMAL
CO2 SERPL-SCNC: 21 MMOL/L (ref 23–29)
COLOR UR AUTO: YELLOW
CREAT SERPL-MCNC: 0.9 MG/DL (ref 0.5–1.4)
ERYTHROCYTE [DISTWIDTH] IN BLOOD BY AUTOMATED COUNT: 18.3 % (ref 11.5–14.5)
GFR SERPLBLD CREATININE-BSD FMLA CKD-EPI: >60 ML/MIN/1.73/M2
GLUCOSE SERPL-MCNC: 110 MG/DL (ref 70–110)
GLUCOSE UR QL STRIP: NEGATIVE
HCT VFR BLD AUTO: 36.8 % (ref 37–48.5)
HCV AB SERPL QL IA: NEGATIVE
HGB BLD-MCNC: 11.4 GM/DL (ref 12–16)
HGB UR QL STRIP: NEGATIVE
HIV 1+2 AB+HIV1 P24 AG SERPL QL IA: NEGATIVE
HOLD SPECIMEN: NORMAL
HOLD SPECIMEN: NORMAL
IMM GRANULOCYTES # BLD AUTO: 0.03 K/UL (ref 0–0.04)
IMM GRANULOCYTES NFR BLD AUTO: 0.4 % (ref 0–0.5)
KETONES UR QL STRIP: NEGATIVE
LEUKOCYTE ESTERASE UR QL STRIP: ABNORMAL
LYMPHOCYTES # BLD AUTO: 2.02 K/UL (ref 1–4.8)
MCH RBC QN AUTO: 24.5 PG (ref 27–31)
MCHC RBC AUTO-ENTMCNC: 31 G/DL (ref 32–36)
MCV RBC AUTO: 79 FL (ref 82–98)
MICROSCOPIC COMMENT: ABNORMAL
NITRITE UR QL STRIP: POSITIVE
NUCLEATED RBC (/100WBC) (OHS): 0 /100 WBC
PH UR STRIP: 7 [PH]
PLATELET # BLD AUTO: 286 K/UL (ref 150–450)
PMV BLD AUTO: 10.9 FL (ref 9.2–12.9)
POTASSIUM SERPL-SCNC: 3.6 MMOL/L (ref 3.5–5.1)
PROT SERPL-MCNC: 8.6 GM/DL (ref 6–8.4)
PROT UR QL STRIP: ABNORMAL
RBC # BLD AUTO: 4.66 M/UL (ref 4–5.4)
RBC #/AREA URNS AUTO: 11 /HPF (ref 0–4)
RELATIVE EOSINOPHIL (OHS): 0.6 %
RELATIVE LYMPHOCYTE (OHS): 30.2 % (ref 18–48)
RELATIVE MONOCYTE (OHS): 8.1 % (ref 4–15)
RELATIVE NEUTROPHIL (OHS): 60 % (ref 38–73)
SODIUM SERPL-SCNC: 136 MMOL/L (ref 136–145)
SP GR UR STRIP: 1.02
SQUAMOUS #/AREA URNS AUTO: 2 /HPF
TROPONIN I SERPL DL<=0.01 NG/ML-MCNC: 0.03 NG/ML
UROBILINOGEN UR STRIP-ACNC: ABNORMAL EU/DL
WBC # BLD AUTO: 6.68 K/UL (ref 3.9–12.7)
WBC #/AREA URNS AUTO: 33 /HPF (ref 0–5)

## 2025-05-25 PROCEDURE — 83880 ASSAY OF NATRIURETIC PEPTIDE: CPT | Performed by: PHYSICIAN ASSISTANT

## 2025-05-25 PROCEDURE — 85025 COMPLETE CBC W/AUTO DIFF WBC: CPT | Performed by: PHYSICIAN ASSISTANT

## 2025-05-25 PROCEDURE — 93005 ELECTROCARDIOGRAM TRACING: CPT

## 2025-05-25 PROCEDURE — 80053 COMPREHEN METABOLIC PANEL: CPT | Performed by: PHYSICIAN ASSISTANT

## 2025-05-25 PROCEDURE — 93010 ELECTROCARDIOGRAM REPORT: CPT | Mod: ,,, | Performed by: INTERNAL MEDICINE

## 2025-05-25 PROCEDURE — 96374 THER/PROPH/DIAG INJ IV PUSH: CPT

## 2025-05-25 PROCEDURE — 81003 URINALYSIS AUTO W/O SCOPE: CPT | Performed by: PHYSICIAN ASSISTANT

## 2025-05-25 PROCEDURE — 84484 ASSAY OF TROPONIN QUANT: CPT | Performed by: PHYSICIAN ASSISTANT

## 2025-05-25 PROCEDURE — 25000003 PHARM REV CODE 250: Performed by: EMERGENCY MEDICINE

## 2025-05-25 PROCEDURE — 87077 CULTURE AEROBIC IDENTIFY: CPT | Performed by: PHYSICIAN ASSISTANT

## 2025-05-25 PROCEDURE — 99285 EMERGENCY DEPT VISIT HI MDM: CPT | Mod: 25

## 2025-05-25 PROCEDURE — 63600175 PHARM REV CODE 636 W HCPCS: Performed by: EMERGENCY MEDICINE

## 2025-05-25 PROCEDURE — 25000003 PHARM REV CODE 250: Performed by: PHYSICIAN ASSISTANT

## 2025-05-25 PROCEDURE — 96375 TX/PRO/DX INJ NEW DRUG ADDON: CPT

## 2025-05-25 PROCEDURE — 86803 HEPATITIS C AB TEST: CPT | Performed by: EMERGENCY MEDICINE

## 2025-05-25 PROCEDURE — 87389 HIV-1 AG W/HIV-1&-2 AB AG IA: CPT | Performed by: EMERGENCY MEDICINE

## 2025-05-25 RX ORDER — CEFTRIAXONE 1 G/1
1 INJECTION, POWDER, FOR SOLUTION INTRAMUSCULAR; INTRAVENOUS
Status: COMPLETED | OUTPATIENT
Start: 2025-05-25 | End: 2025-05-25

## 2025-05-25 RX ORDER — BUTALBITAL, ACETAMINOPHEN AND CAFFEINE 50; 325; 40 MG/1; MG/1; MG/1
2 TABLET ORAL
Status: COMPLETED | OUTPATIENT
Start: 2025-05-25 | End: 2025-05-25

## 2025-05-25 RX ORDER — AMLODIPINE BESYLATE 5 MG/1
10 TABLET ORAL
Status: COMPLETED | OUTPATIENT
Start: 2025-05-25 | End: 2025-05-25

## 2025-05-25 RX ORDER — LOSARTAN POTASSIUM 50 MG/1
100 TABLET ORAL ONCE
Status: COMPLETED | OUTPATIENT
Start: 2025-05-25 | End: 2025-05-25

## 2025-05-25 RX ORDER — FUROSEMIDE 20 MG/1
20 TABLET ORAL DAILY
Qty: 3 TABLET | Refills: 0 | Status: SHIPPED | OUTPATIENT
Start: 2025-05-25 | End: 2025-05-28

## 2025-05-25 RX ORDER — CEPHALEXIN 500 MG/1
500 CAPSULE ORAL 4 TIMES DAILY
Qty: 28 CAPSULE | Refills: 0 | Status: SHIPPED | OUTPATIENT
Start: 2025-05-25 | End: 2025-06-01

## 2025-05-25 RX ORDER — FUROSEMIDE 10 MG/ML
40 INJECTION INTRAMUSCULAR; INTRAVENOUS
Status: COMPLETED | OUTPATIENT
Start: 2025-05-25 | End: 2025-05-25

## 2025-05-25 RX ADMIN — LOSARTAN POTASSIUM 100 MG: 50 TABLET, FILM COATED ORAL at 05:05

## 2025-05-25 RX ADMIN — BUTALBITAL, ACETAMINOPHEN, AND CAFFEINE 2 TABLET: 325; 50; 40 TABLET ORAL at 02:05

## 2025-05-25 RX ADMIN — FUROSEMIDE 40 MG: 10 INJECTION, SOLUTION INTRAMUSCULAR; INTRAVENOUS at 05:05

## 2025-05-25 RX ADMIN — CEFTRIAXONE 1 G: 1 INJECTION, POWDER, FOR SOLUTION INTRAMUSCULAR; INTRAVENOUS at 05:05

## 2025-05-25 RX ADMIN — AMLODIPINE BESYLATE 10 MG: 5 TABLET ORAL at 05:05

## 2025-05-26 LAB
OHS QRS DURATION: 104 MS
OHS QTC CALCULATION: 412 MS

## 2025-05-28 LAB — BACTERIA UR CULT: ABNORMAL

## 2025-07-21 ENCOUNTER — OFFICE VISIT (OUTPATIENT)
Dept: FAMILY MEDICINE | Facility: CLINIC | Age: 75
End: 2025-07-21
Payer: MEDICARE

## 2025-07-21 ENCOUNTER — LAB VISIT (OUTPATIENT)
Dept: LAB | Facility: HOSPITAL | Age: 75
End: 2025-07-21
Attending: FAMILY MEDICINE
Payer: MEDICARE

## 2025-07-21 VITALS
HEART RATE: 64 BPM | WEIGHT: 160.94 LBS | DIASTOLIC BLOOD PRESSURE: 68 MMHG | OXYGEN SATURATION: 98 % | SYSTOLIC BLOOD PRESSURE: 142 MMHG | BODY MASS INDEX: 27.48 KG/M2 | HEIGHT: 64 IN

## 2025-07-21 DIAGNOSIS — E11.9 DIABETES MELLITUS, TYPE II, INSULIN DEPENDENT: ICD-10-CM

## 2025-07-21 DIAGNOSIS — J44.9 MIXED RESTRICTIVE AND OBSTRUCTIVE LUNG DISEASE: ICD-10-CM

## 2025-07-21 DIAGNOSIS — I15.2 HYPERTENSION ASSOCIATED WITH DIABETES: ICD-10-CM

## 2025-07-21 DIAGNOSIS — Z12.31 BREAST CANCER SCREENING BY MAMMOGRAM: ICD-10-CM

## 2025-07-21 DIAGNOSIS — D86.9 SARCOIDOSIS: ICD-10-CM

## 2025-07-21 DIAGNOSIS — F51.04 CHRONIC INSOMNIA: ICD-10-CM

## 2025-07-21 DIAGNOSIS — E11.59 HYPERTENSION ASSOCIATED WITH DIABETES: ICD-10-CM

## 2025-07-21 DIAGNOSIS — M54.16 CHRONIC LEFT-SIDED LUMBAR RADICULOPATHY: Primary | ICD-10-CM

## 2025-07-21 DIAGNOSIS — J98.4 MIXED RESTRICTIVE AND OBSTRUCTIVE LUNG DISEASE: ICD-10-CM

## 2025-07-21 DIAGNOSIS — Z79.4 DIABETES MELLITUS, TYPE II, INSULIN DEPENDENT: ICD-10-CM

## 2025-07-21 DIAGNOSIS — L30.9 ECZEMA, UNSPECIFIED TYPE: ICD-10-CM

## 2025-07-21 LAB
ABSOLUTE EOSINOPHIL (OHS): 0.14 K/UL
ABSOLUTE MONOCYTE (OHS): 0.77 K/UL (ref 0.3–1)
ABSOLUTE NEUTROPHIL COUNT (OHS): 3.66 K/UL (ref 1.8–7.7)
ALBUMIN SERPL BCP-MCNC: 3.5 G/DL (ref 3.5–5.2)
ALP SERPL-CCNC: 157 UNIT/L (ref 40–150)
ALT SERPL W/O P-5'-P-CCNC: 42 UNIT/L (ref 10–44)
ANION GAP (OHS): 9 MMOL/L (ref 8–16)
AST SERPL-CCNC: 36 UNIT/L (ref 11–45)
BASOPHILS # BLD AUTO: 0.04 K/UL
BASOPHILS NFR BLD AUTO: 0.6 %
BILIRUB SERPL-MCNC: 0.2 MG/DL (ref 0.1–1)
BUN SERPL-MCNC: 24 MG/DL (ref 8–23)
CALCIUM SERPL-MCNC: 8.9 MG/DL (ref 8.7–10.5)
CHLORIDE SERPL-SCNC: 104 MMOL/L (ref 95–110)
CHOLEST SERPL-MCNC: 153 MG/DL (ref 120–199)
CHOLEST/HDLC SERPL: 2.4 {RATIO} (ref 2–5)
CO2 SERPL-SCNC: 25 MMOL/L (ref 23–29)
CREAT SERPL-MCNC: 1 MG/DL (ref 0.5–1.4)
EAG (OHS): 169 MG/DL (ref 68–131)
ERYTHROCYTE [DISTWIDTH] IN BLOOD BY AUTOMATED COUNT: 16.4 % (ref 11.5–14.5)
FERRITIN SERPL-MCNC: 7 NG/ML (ref 20–300)
GFR SERPLBLD CREATININE-BSD FMLA CKD-EPI: 59 ML/MIN/1.73/M2
GLUCOSE SERPL-MCNC: 156 MG/DL (ref 70–110)
HBA1C MFR BLD: 7.5 % (ref 4–5.6)
HCT VFR BLD AUTO: 32.8 % (ref 37–48.5)
HDLC SERPL-MCNC: 65 MG/DL (ref 40–75)
HDLC SERPL: 42.5 % (ref 20–50)
HGB BLD-MCNC: 9.8 GM/DL (ref 12–16)
IMM GRANULOCYTES # BLD AUTO: 0.01 K/UL (ref 0–0.04)
IMM GRANULOCYTES NFR BLD AUTO: 0.1 % (ref 0–0.5)
IRON SATN MFR SERPL: 6 % (ref 20–50)
IRON SERPL-MCNC: 29 UG/DL (ref 30–160)
LDLC SERPL CALC-MCNC: 71.6 MG/DL (ref 63–159)
LYMPHOCYTES # BLD AUTO: 2.31 K/UL (ref 1–4.8)
MCH RBC QN AUTO: 24.1 PG (ref 27–31)
MCHC RBC AUTO-ENTMCNC: 29.9 G/DL (ref 32–36)
MCV RBC AUTO: 81 FL (ref 82–98)
NONHDLC SERPL-MCNC: 88 MG/DL
NUCLEATED RBC (/100WBC) (OHS): 0 /100 WBC
PLATELET # BLD AUTO: 263 K/UL (ref 150–450)
PMV BLD AUTO: 12.7 FL (ref 9.2–12.9)
POTASSIUM SERPL-SCNC: 3.9 MMOL/L (ref 3.5–5.1)
PROT SERPL-MCNC: 7.5 GM/DL (ref 6–8.4)
RBC # BLD AUTO: 4.07 M/UL (ref 4–5.4)
RELATIVE EOSINOPHIL (OHS): 2 %
RELATIVE LYMPHOCYTE (OHS): 33.3 % (ref 18–48)
RELATIVE MONOCYTE (OHS): 11.1 % (ref 4–15)
RELATIVE NEUTROPHIL (OHS): 52.9 % (ref 38–73)
SODIUM SERPL-SCNC: 138 MMOL/L (ref 136–145)
TIBC SERPL-MCNC: 505 UG/DL (ref 250–450)
TRANSFERRIN SERPL-MCNC: 341 MG/DL (ref 200–375)
TRIGL SERPL-MCNC: 82 MG/DL (ref 30–150)
WBC # BLD AUTO: 6.93 K/UL (ref 3.9–12.7)

## 2025-07-21 PROCEDURE — 3008F BODY MASS INDEX DOCD: CPT | Mod: CPTII,S$GLB,, | Performed by: FAMILY MEDICINE

## 2025-07-21 PROCEDURE — 99204 OFFICE O/P NEW MOD 45 MIN: CPT | Mod: S$GLB,,, | Performed by: FAMILY MEDICINE

## 2025-07-21 PROCEDURE — 99999 PR PBB SHADOW E&M-EST. PATIENT-LVL V: CPT | Mod: PBBFAC,,, | Performed by: FAMILY MEDICINE

## 2025-07-21 PROCEDURE — 1126F AMNT PAIN NOTED NONE PRSNT: CPT | Mod: CPTII,S$GLB,, | Performed by: FAMILY MEDICINE

## 2025-07-21 PROCEDURE — 36415 COLL VENOUS BLD VENIPUNCTURE: CPT | Mod: PN

## 2025-07-21 PROCEDURE — 1101F PT FALLS ASSESS-DOCD LE1/YR: CPT | Mod: CPTII,S$GLB,, | Performed by: FAMILY MEDICINE

## 2025-07-21 PROCEDURE — 3077F SYST BP >= 140 MM HG: CPT | Mod: CPTII,S$GLB,, | Performed by: FAMILY MEDICINE

## 2025-07-21 PROCEDURE — 82728 ASSAY OF FERRITIN: CPT

## 2025-07-21 PROCEDURE — 80061 LIPID PANEL: CPT

## 2025-07-21 PROCEDURE — 3078F DIAST BP <80 MM HG: CPT | Mod: CPTII,S$GLB,, | Performed by: FAMILY MEDICINE

## 2025-07-21 PROCEDURE — 1159F MED LIST DOCD IN RCRD: CPT | Mod: CPTII,S$GLB,, | Performed by: FAMILY MEDICINE

## 2025-07-21 PROCEDURE — 85025 COMPLETE CBC W/AUTO DIFF WBC: CPT

## 2025-07-21 PROCEDURE — 83036 HEMOGLOBIN GLYCOSYLATED A1C: CPT

## 2025-07-21 PROCEDURE — 80053 COMPREHEN METABOLIC PANEL: CPT

## 2025-07-21 PROCEDURE — 83540 ASSAY OF IRON: CPT

## 2025-07-21 PROCEDURE — 3288F FALL RISK ASSESSMENT DOCD: CPT | Mod: CPTII,S$GLB,, | Performed by: FAMILY MEDICINE

## 2025-07-21 RX ORDER — INSULIN GLARGINE 300 U/ML
INJECTION, SOLUTION SUBCUTANEOUS DAILY
COMMUNITY
Start: 2025-04-09

## 2025-07-21 RX ORDER — BUDESONIDE AND FORMOTEROL FUMARATE DIHYDRATE 160; 4.5 UG/1; UG/1
2 AEROSOL RESPIRATORY (INHALATION) EVERY 12 HOURS
COMMUNITY
Start: 2025-04-09

## 2025-07-21 RX ORDER — AMLODIPINE BESYLATE 5 MG/1
5 TABLET ORAL DAILY
COMMUNITY

## 2025-07-21 RX ORDER — AMITRIPTYLINE HYDROCHLORIDE 50 MG/1
50 TABLET, FILM COATED ORAL NIGHTLY
COMMUNITY

## 2025-07-21 RX ORDER — FLUCONAZOLE 150 MG/1
150 TABLET ORAL ONCE
COMMUNITY
Start: 2025-06-03

## 2025-07-21 RX ORDER — CLOBETASOL PROPIONATE 0.5 MG/G
CREAM TOPICAL 2 TIMES DAILY
COMMUNITY
Start: 2025-05-27

## 2025-07-21 RX ORDER — ASPIRIN 325 MG
50000 TABLET, DELAYED RELEASE (ENTERIC COATED) ORAL
Qty: 12 CAPSULE | Refills: 0 | Status: SHIPPED | OUTPATIENT
Start: 2025-07-21

## 2025-07-21 RX ORDER — PIMECROLIMUS 10 MG/G
CREAM TOPICAL 2 TIMES DAILY
Qty: 60 G | Refills: 3 | Status: SHIPPED | OUTPATIENT
Start: 2025-07-21

## 2025-07-21 RX ORDER — FERROUS SULFATE 325(65) MG
325 TABLET ORAL DAILY
COMMUNITY
Start: 2025-06-14

## 2025-07-21 NOTE — PROGRESS NOTES
Chief Complaint:    Chief Complaint   Patient presents with    Establish Care       History of Present Illness:    History of Present Illness    Patient presents today for establishment of care after changing doctors. She reports vertigo for approximately two months, currently on medication. She has unilateral foot swelling of recent onset with soreness that occasionally radiates to the thighs. She was evaluated at [facility] in May where ultrasound and CT were performed. She denies trouble breathing but reports occasional shortness of breath. She reports severe eczema affecting legs and other body areas. Skin biopsy revealed dark spots. She is seeking a new dermatologist for management. She has COPD and sarcoidosis with significant exercise limitation, becoming tired and short of breath with walking. She uses Ventolin inhaler as needed for respiratory symptoms related to sarcoidosis. She reports difficulty maintaining previous activity levels despite wanting to remain active. She was diagnosed with diabetes approximately 10 years ago, currently managed with Tujeo insulin. Morning blood sugar today was 120. A1C was less than 8 four months ago. She has history of back injury with recurrent lower back flare-ups from accidents years ago, managed with pain management injections and medication. No surgical intervention for back condition. Current medications include amlodipine, Coreg 6.25 mg twice daily, and valsartan for blood pressure; Omeprazole for heartburn; iron supplements (causing GI side effects); Vitamin D 50,000 units weekly; and Ventolin inhaler as needed. She denies taking Januvia or ibuprofen. She quit smoking 35 years ago and denies current alcohol use.      ROS:  General: denies fever, denies chills, admits fatigue, denies weight gain, denies weight loss, denies loss of appetite  Eyes: denies vision changes, denies blurry vision, denies eye pain, denies eye discharge  ENT: denies ear pain,  denies hearing loss, denies tinnitus, denies nasal congestion, denies sore throat  Cardiovascular: denies chest pain, denies palpitations, admits lower extremity edema  Respiratory: denies cough, admits shortness of breath, denies wheezing, denies sputum production  Endocrine: denies polyuria, denies polydipsia, denies heat intolerance, denies cold intolerance  Gastrointestinal: denies abdominal pain, denies heartburn, denies nausea, denies vomiting, denies diarrhea, denies constipation, denies blood in stool  Genitourinary: denies dysuria, denies urgency, denies frequency, denies hematuria, denies nocturia, denies incontinence  Heme & Lymphatic: denies easy or excessive bleeding, denies easy bruising, denies swollen lymph nodes  Musculoskeletal: denies muscle pain, admits back pain, denies joint pain, denies joint swelling, admits pain with movement  Skin: denies rash, denies lesion, denies itching, denies skin texture changes, denies skin color changes  Neurological: denies headache, denies dizziness, denies numbness, denies tingling, denies seizure activity, denies speech difficulty, denies memory loss, denies confusion , admits vertigo  Psychiatric: denies anxiety, denies depression, admits sleep difficulty           ROS:  Review of Systems    Past Medical History:   Diagnosis Date    Diabetes mellitus without complication     GERD (gastroesophageal reflux disease)     Hypertension associated with diabetes     Sarcoidosis        Social History:  Social History     Socioeconomic History    Marital status:    Tobacco Use    Smoking status: Former    Smokeless tobacco: Never   Substance and Sexual Activity    Alcohol use: No    Drug use: No       Family History:   family history is not on file.    Health Maintenance   Topic Date Due    Foot Exam  Never done    Shingles Vaccine (1 of 2) Never done    RSV Vaccine (Age 60+ and Pregnant patients) (1 - Risk 60-74 years 1-dose series) Never done    Diabetes Urine  "Screening  06/29/2017    Pneumococcal Vaccines (Age 50+) (2 of 2 - PCV) 10/14/2017    COVID-19 Vaccine (3 - 2024-25 season) 09/01/2024    Mammogram  05/16/2025    Influenza Vaccine (1) 09/01/2025    Lipid Panel  05/10/2026    Colorectal Cancer Screening  12/19/2028    DEXA Scan  05/16/2029    TETANUS VACCINE  02/21/2034    Hepatitis C Screening  Completed       Physical Exam:    Vital Signs  Pulse: 64  SpO2: 98 %  BP: (!) 142/68  Pain Score: 0-No pain  Height and Weight  Height: 5' 4" (162.6 cm)  Weight: 73 kg (160 lb 15 oz)  BSA (Calculated - sq m): 1.82 sq meters  BMI (Calculated): 27.6  Weight in (lb) to have BMI = 25: 145.3]    Body mass index is 27.62 kg/m².    Physical Exam  Constitutional:       Appearance: She is well-developed.   Eyes:      Conjunctiva/sclera: Conjunctivae normal.      Pupils: Pupils are equal, round, and reactive to light.   Cardiovascular:      Rate and Rhythm: Normal rate and regular rhythm.      Heart sounds: Normal heart sounds. No murmur heard.  Pulmonary:      Effort: Pulmonary effort is normal. No respiratory distress.      Breath sounds: Normal breath sounds. No wheezing or rales.   Chest:      Chest wall: No tenderness.   Abdominal:      General: There is no distension.      Palpations: Abdomen is soft. There is no mass.      Tenderness: There is no abdominal tenderness. There is no guarding.   Musculoskeletal:         General: No tenderness.      Cervical back: Normal range of motion and neck supple.   Lymphadenopathy:      Cervical: No cervical adenopathy.   Skin:     General: Skin is warm and dry.   Neurological:      Mental Status: She is alert and oriented to person, place, and time.      Deep Tendon Reflexes: Reflexes are normal and symmetric.   Psychiatric:         Behavior: Behavior normal.         Thought Content: Thought content normal.         Judgment: Judgment normal.           Diabetes Management Status    Statin: Not taking  ACE/ARB: Taking    Screening or " Prevention Patient's value Goal Complete/Controlled?   HgA1C Testing and Control   Lab Results   Component Value Date    HGBA1C 8.4 (A) 05/10/2021      Annually/Less than 8% No   Lipid profile Most Recent Lipid Panel Health Maintenance Topic Completion: Not Found Annually No   LDL control Lab Results   Component Value Date    LDLCALC 92 05/10/2021    Annually/Less than 100 mg/dl  No   Nephropathy screening Lab Results   Component Value Date    LABMICR 5.0 06/29/2016     Lab Results   Component Value Date    PROTEINUA Trace (A) 05/25/2025    Annually Yes   Blood pressure BP Readings from Last 1 Encounters:   07/21/25 (!) 142/68    Less than 140/90 No   Dilated retinal exam Most Recent Eye Exam Date: Not Found Annually Yes   Foot exam   Most Recent Foot Exam Date: Not Found Annually No       Assessment:      ICD-10-CM ICD-9-CM   1. Chronic left-sided lumbar radiculopathy  M54.16 724.4   2. Diabetes mellitus, type II, insulin dependent  E11.9 250.00    Z79.4 V58.67   3. Hypertension associated with diabetes  E11.59 250.80    I15.2 401.9   4. Mixed restrictive and obstructive lung disease  J44.9 496    J98.4 518.89   5. Sarcoidosis  D86.9 135   6. Chronic insomnia  F51.04 780.52   7. Breast cancer screening by mammogram  Z12.31 V76.12   8. Eczema, unspecified type  L30.9 692.9         Plan:    Assessment & Plan    MEDICAL DECISION MAKING:  - Reviewed medical history, including diabetes, HTN, COPD, sarcoidosis, and back pain.  - Considered potential causes of leg swelling, including medication side effects and venous insufficiency.  - Evaluated eczema symptoms and current treatment regimen.  - Assessed need for pulmonology follow-up due to history of sarcoidosis and COPD.  - Determined need for comprehensive medication review at next visit.  - Considered US to rule out DVT, but noted recent negative workup at [facility].    PATIENT EDUCATION:  - Discussed the function of venous valves and how their malfunction can lead  to leg swelling.  - Educated on the use and benefits of compression stockings for managing leg swelling.    ACTION ITEMS/LIFESTYLE:  - Patient to wear compression stockings during the day and elevate legs while sleeping to manage leg swelling.  - Patient to bring all current medications to the next appointment for review.    MEDICATIONS:  - Started new cream for eczema (to be sent to [facility]).  - Continued Vitamin D 50,000 units once weekly.    ORDERS:  - Ordered CBC, CMP, Lipid panel, A1C.  - Ordered Mammogram.    REFERRALS:  - Referred to Pulmonologist (Dr. Pride or colleague at [facility]).  - Referred to Dermatologist at [facility].    FOLLOW UP:  - Follow up in 1-2 weeks for medication review and discussion of blood test results.             Orders Placed This Encounter   Procedures    Mammo Digital Screening Bilat    CBC Auto Differential    Comprehensive Metabolic Panel    Lipid Panel    Hemoglobin A1C    Iron and TIBC    Ferritin    Ambulatory referral/consult to Pulmonology    Ambulatory referral/consult to Dermatology       Current Medications[1]    Medications Discontinued During This Encounter   Medication Reason    JANUVIA 50 mg Tab     cholecalciferol, vitamin D3, 1,250 mcg (50,000 unit) capsule Reorder       Follow up in about 2 weeks (around 8/4/2025).      Lalit Moy MD                       [1]   Current Outpatient Medications   Medication Sig Dispense Refill    albuterol (PROVENTIL HFA) 90 mcg/actuation inhaler Inhale 2 puffs into the lungs every 6 (six) hours as needed for Wheezing. 18 g 11    albuterol-ipratropium (DUO-NEB) 2.5 mg-0.5 mg/3 mL nebulizer solution Take 3 mLs by nebulization every 6 (six) hours as needed for Wheezing or Shortness of Breath. 120 vial 11    amitriptyline (ELAVIL) 50 MG tablet Take 50 mg by mouth every evening.      amLODIPine (NORVASC) 5 MG tablet Take 5 mg by mouth once daily.      amlodipine-olmesartan (JULIO) 10-40 mg per tablet Take 1 tablet by mouth once daily.        aspirin (ECOTRIN) 81 MG EC tablet Take 81 mg by mouth once daily.      carvediloL (COREG) 6.25 MG tablet Take 6.25 mg by mouth 2 (two) times daily.       clindamycin (CLEOCIN) 300 MG capsule Take 1 capsule (300 mg total) by mouth 3 (three) times daily. 30 capsule 0    clobetasoL (TEMOVATE) 0.05 % cream Apply topically 2 (two) times daily.      clotrimazole-betamethasone 1-0.05% (LOTRISONE) cream Apply topically 2 (two) times daily as needed.       cyclobenzaprine (FLEXERIL) 5 MG tablet Take 5 mg by mouth 3 (three) times daily as needed.       diclofenac sodium (VOLTAREN) 1 % Gel Apply 2 g topically daily as needed.       FEROSUL 325 mg (65 mg iron) Tab tablet Take 325 mg by mouth once daily.      fluconazole (DIFLUCAN) 150 MG Tab Take 150 mg by mouth once.      furosemide (LASIX) 20 MG tablet Take 1 tablet (20 mg total) by mouth once daily. for 3 days 3 tablet 0    HYDROcodone-acetaminophen (NORCO) 5-325 mg per tablet Take 1 tablet by mouth every 6 (six) hours as needed for Pain. 18 tablet 0    ibuprofen (ADVIL,MOTRIN) 800 MG tablet Take 800 mg by mouth every 6 (six) hours as needed.       methocarbamoL (ROBAXIN) 500 MG Tab Take 500 mg by mouth 3 (three) times daily as needed.       montelukast (SINGULAIR) 10 mg tablet Take 1 tablet (10 mg total) by mouth every evening. 90 tablet 3    omeprazole (PRILOSEC) 20 MG capsule TAKE ONE CAPSULE BY MOUTH DAILY      ondansetron (ZOFRAN) 4 MG tablet Take 1 tablet (4 mg total) by mouth every 6 (six) hours. 12 tablet 0    ondansetron (ZOFRAN-ODT) 4 MG TbDL Take 4 mg by mouth every 12 (twelve) hours as needed.   0    SYMBICORT 160-4.5 mcg/actuation HFAA Inhale 2 puffs into the lungs every 12 (twelve) hours.      TOUJEO SOLOSTAR U-300 INSULIN 300 unit/mL (1.5 mL) InPn pen Inject into the skin once daily.      TRUE METRIX GLUCOSE TEST STRIP Strp 1 strip 3 (three) times daily.      TRUEPLUS LANCETS 28 gauge Misc 1 lancet 3 (three) times daily.       zolpidem (AMBIEN) 10 mg Tab  Take 10 mg by mouth nightly as needed.      cholecalciferol, vitamin D3, 1,250 mcg (50,000 unit) capsule Take 1 capsule (50,000 Units total) by mouth every 7 days. 12 capsule 0    pimecrolimus (ELIDEL) 1 % cream Apply topically 2 (two) times daily. 60 g 3     No current facility-administered medications for this visit.

## 2025-07-25 ENCOUNTER — HOSPITAL ENCOUNTER (OUTPATIENT)
Dept: RADIOLOGY | Facility: HOSPITAL | Age: 75
Discharge: HOME | End: 2025-07-25
Attending: FAMILY MEDICINE
Payer: MEDICARE

## 2025-07-25 DIAGNOSIS — Z12.31 BREAST CANCER SCREENING BY MAMMOGRAM: ICD-10-CM

## 2025-07-25 PROCEDURE — 77063 BREAST TOMOSYNTHESIS BI: CPT | Mod: TC

## 2025-08-06 ENCOUNTER — OFFICE VISIT (OUTPATIENT)
Dept: FAMILY MEDICINE | Facility: CLINIC | Age: 75
End: 2025-08-06
Payer: MEDICARE

## 2025-08-06 VITALS
WEIGHT: 162.94 LBS | DIASTOLIC BLOOD PRESSURE: 72 MMHG | OXYGEN SATURATION: 98 % | BODY MASS INDEX: 27.82 KG/M2 | HEIGHT: 64 IN | HEART RATE: 65 BPM | SYSTOLIC BLOOD PRESSURE: 132 MMHG

## 2025-08-06 DIAGNOSIS — I15.2 HYPERTENSION ASSOCIATED WITH DIABETES: Primary | ICD-10-CM

## 2025-08-06 DIAGNOSIS — E11.59 HYPERTENSION ASSOCIATED WITH DIABETES: Primary | ICD-10-CM

## 2025-08-06 DIAGNOSIS — D50.0 IRON DEFICIENCY ANEMIA DUE TO CHRONIC BLOOD LOSS: ICD-10-CM

## 2025-08-06 PROCEDURE — G2211 COMPLEX E/M VISIT ADD ON: HCPCS | Mod: HCNC,S$GLB,, | Performed by: FAMILY MEDICINE

## 2025-08-06 PROCEDURE — 3288F FALL RISK ASSESSMENT DOCD: CPT | Mod: CPTII,HCNC,S$GLB, | Performed by: FAMILY MEDICINE

## 2025-08-06 PROCEDURE — 1159F MED LIST DOCD IN RCRD: CPT | Mod: CPTII,HCNC,S$GLB, | Performed by: FAMILY MEDICINE

## 2025-08-06 PROCEDURE — 3008F BODY MASS INDEX DOCD: CPT | Mod: CPTII,HCNC,S$GLB, | Performed by: FAMILY MEDICINE

## 2025-08-06 PROCEDURE — 1101F PT FALLS ASSESS-DOCD LE1/YR: CPT | Mod: CPTII,HCNC,S$GLB, | Performed by: FAMILY MEDICINE

## 2025-08-06 PROCEDURE — 99999 PR PBB SHADOW E&M-EST. PATIENT-LVL IV: CPT | Mod: PBBFAC,HCNC,, | Performed by: FAMILY MEDICINE

## 2025-08-06 PROCEDURE — 3075F SYST BP GE 130 - 139MM HG: CPT | Mod: CPTII,HCNC,S$GLB, | Performed by: FAMILY MEDICINE

## 2025-08-06 PROCEDURE — 3078F DIAST BP <80 MM HG: CPT | Mod: CPTII,HCNC,S$GLB, | Performed by: FAMILY MEDICINE

## 2025-08-06 PROCEDURE — 99214 OFFICE O/P EST MOD 30 MIN: CPT | Mod: HCNC,S$GLB,, | Performed by: FAMILY MEDICINE

## 2025-08-06 PROCEDURE — 3051F HG A1C>EQUAL 7.0%<8.0%: CPT | Mod: CPTII,HCNC,S$GLB, | Performed by: FAMILY MEDICINE

## 2025-08-06 PROCEDURE — 1126F AMNT PAIN NOTED NONE PRSNT: CPT | Mod: CPTII,HCNC,S$GLB, | Performed by: FAMILY MEDICINE

## 2025-08-06 RX ORDER — VALSARTAN AND HYDROCHLOROTHIAZIDE 320; 25 MG/1; MG/1
1 TABLET, FILM COATED ORAL DAILY
Qty: 90 TABLET | Refills: 3 | Status: SHIPPED | OUTPATIENT
Start: 2025-08-06 | End: 2026-08-06

## 2025-08-06 RX ORDER — FERROUS GLUCONATE 324(38)MG
324 TABLET ORAL
Qty: 60 TABLET | Refills: 6 | Status: SHIPPED | OUTPATIENT
Start: 2025-08-06

## 2025-08-06 RX ORDER — AMLODIPINE BESYLATE 5 MG/1
5 TABLET ORAL DAILY
Qty: 90 TABLET | Refills: 3 | Status: SHIPPED | OUTPATIENT
Start: 2025-08-06

## 2025-08-06 RX ORDER — SEMAGLUTIDE 0.68 MG/ML
0.25 INJECTION, SOLUTION SUBCUTANEOUS
Qty: 3 ML | Refills: 3 | Status: SHIPPED | OUTPATIENT
Start: 2025-08-06

## 2025-08-06 NOTE — PROGRESS NOTES
Chief Complaint:    Chief Complaint   Patient presents with    Follow-up       History of Present Illness:    History of Present Illness    Patient presents today for establishment of care after changing doctors. She reports vertigo for approximately two months, currently on medication. She has unilateral foot swelling of recent onset with soreness that occasionally radiates to the thighs. She was evaluated at [facility] in May where ultrasound and CT were performed. She denies trouble breathing but reports occasional shortness of breath. She reports severe eczema affecting legs and other body areas. Skin biopsy revealed dark spots. She is seeking a new dermatologist for management. She has COPD and sarcoidosis with significant exercise limitation, becoming tired and short of breath with walking. She uses Ventolin inhaler as needed for respiratory symptoms related to sarcoidosis. She reports difficulty maintaining previous activity levels despite wanting to remain active. She was diagnosed with diabetes approximately 10 years ago, currently managed with Tujeo insulin. Morning blood sugar today was 120. A1C was less than 8 four months ago. She has history of back injury with recurrent lower back flare-ups from accidents years ago, managed with pain management injections and medication. No surgical intervention for back condition. Current medications include amlodipine, Coreg 6.25 mg twice daily, and valsartan for blood pressure; Omeprazole for heartburn; iron supplements (causing GI side effects); Vitamin D 50,000 units weekly; and Ventolin inhaler as needed. She denies taking Januvia or ibuprofen. She quit smoking 35 years ago and denies current alcohol use.      ROS:  General: denies fever, denies chills, admits fatigue, denies weight gain, denies weight loss, denies loss of appetite  Eyes: denies vision changes, denies blurry vision, denies eye pain, denies eye discharge  ENT: denies ear pain, denies  hearing loss, denies tinnitus, denies nasal congestion, denies sore throat  Cardiovascular: denies chest pain, denies palpitations, admits lower extremity edema  Respiratory: denies cough, admits shortness of breath, denies wheezing, denies sputum production  Endocrine: denies polyuria, denies polydipsia, denies heat intolerance, denies cold intolerance  Gastrointestinal: denies abdominal pain, denies heartburn, denies nausea, denies vomiting, denies diarrhea, denies constipation, denies blood in stool  Genitourinary: denies dysuria, denies urgency, denies frequency, denies hematuria, denies nocturia, denies incontinence  Heme & Lymphatic: denies easy or excessive bleeding, denies easy bruising, denies swollen lymph nodes  Musculoskeletal: denies muscle pain, admits back pain, denies joint pain, denies joint swelling, admits pain with movement  Skin: denies rash, denies lesion, denies itching, denies skin texture changes, denies skin color changes  Neurological: denies headache, denies dizziness, denies numbness, denies tingling, denies seizure activity, denies speech difficulty, denies memory loss, denies confusion , admits vertigo  Psychiatric: denies anxiety, denies depression, admits sleep difficulty           ROS:  Review of Systems    Past Medical History:   Diagnosis Date    Diabetes mellitus without complication     GERD (gastroesophageal reflux disease)     Hypertension associated with diabetes     Sarcoidosis        Social History:  Social History     Socioeconomic History    Marital status:    Tobacco Use    Smoking status: Former    Smokeless tobacco: Never   Substance and Sexual Activity    Alcohol use: No    Drug use: No       Family History:   family history is not on file.    Health Maintenance   Topic Date Due    Foot Exam  Never done    Shingles Vaccine (1 of 2) Never done    RSV Vaccine (Age 60+ and Pregnant patients) (1 - Risk 60-74 years 1-dose series) Never done    Diabetes Urine  "Screening  06/29/2017    Pneumococcal Vaccines (Age 50+) (2 of 2 - PCV) 10/14/2017    COVID-19 Vaccine (3 - 2024-25 season) 09/01/2024    Influenza Vaccine (1) 09/01/2025    Mammogram  07/25/2026    Colorectal Cancer Screening  12/19/2028    DEXA Scan  05/16/2029    Lipid Panel  07/21/2030    TETANUS VACCINE  02/21/2034    Hepatitis C Screening  Completed       Physical Exam:    Vital Signs  Pulse: 65  SpO2: 98 %  BP: (!) 142/72  Pain Score: 0-No pain  Height and Weight  Height: 5' 4" (162.6 cm)  Weight: 73.9 kg (162 lb 14.7 oz)  BSA (Calculated - sq m): 1.83 sq meters  BMI (Calculated): 28  Weight in (lb) to have BMI = 25: 145.3]    Body mass index is 27.97 kg/m².    Physical Exam  Constitutional:       Appearance: She is well-developed.   Eyes:      Conjunctiva/sclera: Conjunctivae normal.      Pupils: Pupils are equal, round, and reactive to light.   Cardiovascular:      Rate and Rhythm: Normal rate and regular rhythm.      Heart sounds: Normal heart sounds. No murmur heard.  Pulmonary:      Effort: Pulmonary effort is normal. No respiratory distress.      Breath sounds: Normal breath sounds. No wheezing or rales.   Chest:      Chest wall: No tenderness.   Abdominal:      General: There is no distension.      Palpations: Abdomen is soft. There is no mass.      Tenderness: There is no abdominal tenderness. There is no guarding.   Musculoskeletal:         General: No tenderness.      Cervical back: Normal range of motion and neck supple.   Lymphadenopathy:      Cervical: No cervical adenopathy.   Skin:     General: Skin is warm and dry.   Neurological:      Mental Status: She is alert and oriented to person, place, and time.      Deep Tendon Reflexes: Reflexes are normal and symmetric.   Psychiatric:         Behavior: Behavior normal.         Thought Content: Thought content normal.         Judgment: Judgment normal.           Diabetes Management Status    Statin: Not taking  ACE/ARB: Taking    Screening or " Prevention Patient's value Goal Complete/Controlled?   HgA1C Testing and Control   Lab Results   Component Value Date    HGBA1C 7.5 (H) 07/21/2025      Annually/Less than 8% No   Lipid profile : 07/21/2025 Annually No   LDL control Lab Results   Component Value Date    LDLCALC 71.6 07/21/2025    Annually/Less than 100 mg/dl  No   Nephropathy screening Lab Results   Component Value Date    LABMICR 5.0 06/29/2016     Lab Results   Component Value Date    PROTEINUA Trace (A) 05/25/2025    Annually Yes   Blood pressure BP Readings from Last 1 Encounters:   08/06/25 (!) 142/72    Less than 140/90 No   Dilated retinal exam Most Recent Eye Exam Date: Not Found Annually Yes   Foot exam   Most Recent Foot Exam Date: Not Found Annually No       Assessment:      ICD-10-CM ICD-9-CM   1. Hypertension associated with diabetes  E11.59 250.80    I15.2 401.9   2. Iron deficiency anemia due to chronic blood loss  D50.0 280.0         Plan:    Assessment & Plan    MEDICAL DECISION MAKING:  - Assessed blood test results, noting good cholesterol levels but elevated HbA1c at 7.5%.  - Evaluated significant iron deficiency anemia, with hemoglobin dropping from 11.4 to 9.8 and hematocrit from 36.8 to 32.8 over 2 months.  - Suspect potential GI blood loss due to worsening anemia.  - Discontinued daily aspirin due to potential exacerbation of blood loss, informed about risks in context of possible GI bleeding.    MEDICATIONS:  - Initiated Mounjaro (Ozempic) injection once weekly for diabetes management, explaining benefits including appetite control and potential for weight loss.  - Opted for ferrous gluconate as an alternative to ferrous sulfate for better tolerability.  - Continued :  BP meds, home BP readings are okay.  - Continued cream for eczema.    ORDERS:  - Ordered EGD and colonoscopy to investigate potential GI blood loss.  - Ordered follow-up labs in 3 months to reassess iron levels and overall health status.    FOLLOW UP:  - Follow  up in 3 months, a few days after completing labs.  - Contact the office if no communication is received regarding EGD and colonoscopy scheduling within 2 weeks.             Orders Placed This Encounter   Procedures    Hemoglobin A1C    Comprehensive Metabolic Panel    CBC Auto Differential    Microalbumin/Creatinine Ratio, Urine    Lipid Panel    Folate    Vitamin B12    Iron and TIBC    Ferritin    Ambulatory referral/consult to Endo Procedure        Current Medications[1]    Medications Discontinued During This Encounter   Medication Reason    amlodipine-olmesartan (JULIO) 10-40 mg per tablet     omeprazole (PRILOSEC) 20 MG capsule     clotrimazole-betamethasone 1-0.05% (LOTRISONE) cream     methocarbamoL (ROBAXIN) 500 MG Tab     ibuprofen (ADVIL,MOTRIN) 800 MG tablet     diclofenac sodium (VOLTAREN) 1 % Gel     cyclobenzaprine (FLEXERIL) 5 MG tablet     zolpidem (AMBIEN) 10 mg Tab     montelukast (SINGULAIR) 10 mg tablet     clindamycin (CLEOCIN) 300 MG capsule     ondansetron (ZOFRAN) 4 MG tablet     SYMBICORT 160-4.5 mcg/actuation HFAA     clobetasoL (TEMOVATE) 0.05 % cream     fluconazole (DIFLUCAN) 150 MG Tab     FEROSUL 325 mg (65 mg iron) Tab tablet     amLODIPine (NORVASC) 5 MG tablet Reorder       Follow up in about 3 months (around 11/6/2025).      Lalit Moy MD                         [1]   Current Outpatient Medications   Medication Sig Dispense Refill    albuterol (PROVENTIL HFA) 90 mcg/actuation inhaler Inhale 2 puffs into the lungs every 6 (six) hours as needed for Wheezing. 18 g 11    albuterol-ipratropium (DUO-NEB) 2.5 mg-0.5 mg/3 mL nebulizer solution Take 3 mLs by nebulization every 6 (six) hours as needed for Wheezing or Shortness of Breath. 120 vial 11    amitriptyline (ELAVIL) 50 MG tablet Take 50 mg by mouth every evening.      aspirin (ECOTRIN) 81 MG EC tablet Take 81 mg by mouth once daily.      carvediloL (COREG) 6.25 MG tablet Take 6.25 mg by mouth 2 (two) times daily.        cholecalciferol, vitamin D3, 1,250 mcg (50,000 unit) capsule Take 1 capsule (50,000 Units total) by mouth every 7 days. 12 capsule 0    furosemide (LASIX) 20 MG tablet Take 1 tablet (20 mg total) by mouth once daily. for 3 days 3 tablet 0    HYDROcodone-acetaminophen (NORCO) 5-325 mg per tablet Take 1 tablet by mouth every 6 (six) hours as needed for Pain. 18 tablet 0    ondansetron (ZOFRAN-ODT) 4 MG TbDL Take 4 mg by mouth every 12 (twelve) hours as needed.   0    pimecrolimus (ELIDEL) 1 % cream Apply topically 2 (two) times daily. 60 g 3    TOUJEO SOLOSTAR U-300 INSULIN 300 unit/mL (1.5 mL) InPn pen Inject into the skin once daily.      TRUE METRIX GLUCOSE TEST STRIP Strp 1 strip 3 (three) times daily.      TRUEPLUS LANCETS 28 gauge Misc 1 lancet 3 (three) times daily.       amLODIPine (NORVASC) 5 MG tablet Take 1 tablet (5 mg total) by mouth once daily. 90 tablet 3    ferrous gluconate (FERGON) 324 MG tablet Take 1 tablet (324 mg total) by mouth daily with breakfast. 60 tablet 6    semaglutide (OZEMPIC) 0.25 mg or 0.5 mg (2 mg/3 mL) pen injector Inject 0.25 mg into the skin every 7 days. 3 mL 3    valsartan-hydrochlorothiazide (DIOVAN-HCT) 320-25 mg per tablet Take 1 tablet by mouth once daily. 90 tablet 3     No current facility-administered medications for this visit.

## 2025-08-07 ENCOUNTER — HOSPITAL ENCOUNTER (OUTPATIENT)
Dept: PREADMISSION TESTING | Facility: HOSPITAL | Age: 75
Discharge: HOME | End: 2025-08-07
Attending: COLON & RECTAL SURGERY
Payer: MEDICARE

## 2025-08-07 DIAGNOSIS — D50.0 IRON DEFICIENCY ANEMIA DUE TO CHRONIC BLOOD LOSS: ICD-10-CM

## 2025-08-13 ENCOUNTER — HOSPITAL ENCOUNTER (OUTPATIENT)
Dept: PREADMISSION TESTING | Facility: HOSPITAL | Age: 75
Discharge: HOME | End: 2025-08-13
Attending: INTERNAL MEDICINE
Payer: MEDICARE

## 2025-08-13 DIAGNOSIS — D50.0 IRON DEFICIENCY ANEMIA DUE TO CHRONIC BLOOD LOSS: Primary | ICD-10-CM

## 2025-08-18 ENCOUNTER — NURSE TRIAGE (OUTPATIENT)
Dept: ADMINISTRATIVE | Facility: CLINIC | Age: 75
End: 2025-08-18
Payer: MEDICARE

## 2025-08-19 ENCOUNTER — TELEPHONE (OUTPATIENT)
Dept: FAMILY MEDICINE | Facility: CLINIC | Age: 75
End: 2025-08-19
Payer: MEDICARE

## 2025-08-19 RX ORDER — BENZONATATE 200 MG/1
200 CAPSULE ORAL 3 TIMES DAILY PRN
Qty: 30 CAPSULE | Refills: 0 | Status: SHIPPED | OUTPATIENT
Start: 2025-08-19 | End: 2025-08-29

## 2025-08-20 ENCOUNTER — TELEPHONE (OUTPATIENT)
Dept: FAMILY MEDICINE | Facility: CLINIC | Age: 75
End: 2025-08-20
Payer: MEDICARE

## 2025-08-20 ENCOUNTER — HOSPITAL ENCOUNTER (OUTPATIENT)
Dept: ENDOSCOPY | Facility: HOSPITAL | Age: 75
Discharge: HOME | End: 2025-08-20
Attending: FAMILY MEDICINE

## 2025-08-22 ENCOUNTER — TELEPHONE (OUTPATIENT)
Dept: FAMILY MEDICINE | Facility: CLINIC | Age: 75
End: 2025-08-22
Payer: MEDICARE

## 2025-08-22 RX ORDER — AMOXICILLIN 875 MG/1
875 TABLET, COATED ORAL 2 TIMES DAILY
Qty: 14 TABLET | Refills: 0 | Status: SHIPPED | OUTPATIENT
Start: 2025-08-22 | End: 2025-08-29

## 2025-08-27 ENCOUNTER — TELEPHONE (OUTPATIENT)
Dept: PREADMISSION TESTING | Facility: HOSPITAL | Age: 75
End: 2025-08-27
Payer: MEDICARE

## 2025-09-03 ENCOUNTER — ANESTHESIA EVENT (OUTPATIENT)
Dept: ENDOSCOPY | Facility: HOSPITAL | Age: 75
End: 2025-09-03
Payer: MEDICARE

## 2025-09-03 ENCOUNTER — HOSPITAL ENCOUNTER (OUTPATIENT)
Dept: ENDOSCOPY | Facility: HOSPITAL | Age: 75
Discharge: HOME | End: 2025-09-03
Attending: INTERNAL MEDICINE | Admitting: INTERNAL MEDICINE
Payer: MEDICARE

## 2025-09-03 ENCOUNTER — ANESTHESIA (OUTPATIENT)
Dept: ENDOSCOPY | Facility: HOSPITAL | Age: 75
End: 2025-09-03
Payer: MEDICARE

## 2025-09-03 DIAGNOSIS — D50.0 IRON DEFICIENCY ANEMIA DUE TO CHRONIC BLOOD LOSS: ICD-10-CM

## 2025-09-03 PROBLEM — D50.9 IDA (IRON DEFICIENCY ANEMIA): Status: ACTIVE | Noted: 2025-09-03

## 2025-09-03 LAB
GLUCOSE SERPL-MCNC: 112 MG/DL (ref 70–110)
POCT GLUCOSE: 112 MG/DL (ref 70–110)

## 2025-09-03 PROCEDURE — 25000003 PHARM REV CODE 250: Mod: HCNC | Performed by: INTERNAL MEDICINE

## 2025-09-03 PROCEDURE — 37000009 HC ANESTHESIA EA ADD 15 MINS: Mod: HCNC | Performed by: INTERNAL MEDICINE

## 2025-09-03 PROCEDURE — 27201012 HC FORCEPS, HOT/COLD, DISP: Mod: HCNC | Performed by: INTERNAL MEDICINE

## 2025-09-03 PROCEDURE — 63600175 PHARM REV CODE 636 W HCPCS: Mod: HCNC | Performed by: NURSE ANESTHETIST, CERTIFIED REGISTERED

## 2025-09-03 PROCEDURE — 27201089 HC SNARE, DISP (ANY): Mod: HCNC | Performed by: INTERNAL MEDICINE

## 2025-09-03 PROCEDURE — 88305 TISSUE EXAM BY PATHOLOGIST: CPT | Mod: TC,91,HCNC | Performed by: STUDENT IN AN ORGANIZED HEALTH CARE EDUCATION/TRAINING PROGRAM

## 2025-09-03 PROCEDURE — 37000008 HC ANESTHESIA 1ST 15 MINUTES: Mod: HCNC | Performed by: INTERNAL MEDICINE

## 2025-09-03 RX ORDER — PROPOFOL 10 MG/ML
VIAL (ML) INTRAVENOUS
Status: DISCONTINUED | OUTPATIENT
Start: 2025-09-03 | End: 2025-09-03

## 2025-09-03 RX ORDER — DEXTROMETHORPHAN/PSEUDOEPHED 2.5-7.5/.8
DROPS ORAL
Status: COMPLETED | OUTPATIENT
Start: 2025-09-03 | End: 2025-09-03

## 2025-09-03 RX ORDER — LIDOCAINE HYDROCHLORIDE 10 MG/ML
INJECTION, SOLUTION EPIDURAL; INFILTRATION; INTRACAUDAL; PERINEURAL
Status: DISCONTINUED | OUTPATIENT
Start: 2025-09-03 | End: 2025-09-03

## 2025-09-03 RX ADMIN — PROPOFOL 20 MG: 10 INJECTION, EMULSION INTRAVENOUS at 12:09

## 2025-09-03 RX ADMIN — PROPOFOL 20 MG: 10 INJECTION, EMULSION INTRAVENOUS at 11:09

## 2025-09-03 RX ADMIN — SIMETHICONE 40 MG: 20 SUSPENSION/ DROPS ORAL at 12:09

## 2025-09-03 RX ADMIN — PROPOFOL 100 MG: 10 INJECTION, EMULSION INTRAVENOUS at 11:09

## 2025-09-03 RX ADMIN — PROPOFOL 30 MG: 10 INJECTION, EMULSION INTRAVENOUS at 12:09

## 2025-09-03 RX ADMIN — LIDOCAINE HYDROCHLORIDE 50 MG: 10 INJECTION, SOLUTION EPIDURAL; INFILTRATION; INTRACAUDAL; PERINEURAL at 11:09

## 2025-09-04 VITALS
RESPIRATION RATE: 18 BRPM | HEART RATE: 68 BPM | TEMPERATURE: 98 F | HEIGHT: 64 IN | BODY MASS INDEX: 25.61 KG/M2 | WEIGHT: 150 LBS | DIASTOLIC BLOOD PRESSURE: 80 MMHG | OXYGEN SATURATION: 99 % | SYSTOLIC BLOOD PRESSURE: 132 MMHG

## 2025-09-05 LAB
ESTRIOL SERPL-MCNC: NORMAL NG/ML
ESTROGEN SERPL-MCNC: NORMAL PG/ML
INSULIN SERPL-ACNC: NORMAL U[IU]/ML
LAB AP CLINICAL INFORMATION: NORMAL
LAB AP GROSS DESCRIPTION: NORMAL
LAB AP PERFORMING LOCATION(S): NORMAL
LAB AP REPORT FOOTNOTES: NORMAL